# Patient Record
Sex: MALE | Race: WHITE | Employment: FULL TIME | URBAN - METROPOLITAN AREA
[De-identification: names, ages, dates, MRNs, and addresses within clinical notes are randomized per-mention and may not be internally consistent; named-entity substitution may affect disease eponyms.]

---

## 2020-03-07 ENCOUNTER — HOSPITAL ENCOUNTER (OUTPATIENT)
Dept: CT IMAGING | Age: 45
Discharge: HOME | End: 2020-03-07
Attending: SURGERY
Payer: COMMERCIAL

## 2020-03-07 DIAGNOSIS — R14.0 GASTRIC TYMPANY: ICD-10-CM

## 2020-03-07 DIAGNOSIS — R10.13 ABDOMINAL PAIN, EPIGASTRIC: ICD-10-CM

## 2020-03-07 PROCEDURE — 74011636320 HC RX REV CODE- 636/320: Performed by: RADIOLOGY

## 2020-03-07 PROCEDURE — 74011000258 HC RX REV CODE- 258: Performed by: RADIOLOGY

## 2020-03-07 PROCEDURE — 74177 CT ABD & PELVIS W/CONTRAST: CPT

## 2020-03-07 RX ORDER — SODIUM CHLORIDE 0.9 % (FLUSH) 0.9 %
10 SYRINGE (ML) INJECTION
Status: COMPLETED | OUTPATIENT
Start: 2020-03-07 | End: 2020-03-07

## 2020-03-07 RX ADMIN — Medication 10 ML: at 14:44

## 2020-03-07 RX ADMIN — IOPAMIDOL 100 ML: 755 INJECTION, SOLUTION INTRAVENOUS at 14:43

## 2020-03-07 RX ADMIN — SODIUM CHLORIDE 100 ML: 900 INJECTION, SOLUTION INTRAVENOUS at 14:44

## 2020-03-25 ENCOUNTER — HOSPITAL ENCOUNTER (EMERGENCY)
Age: 45
Discharge: HOME | End: 2020-03-25
Attending: EMERGENCY MEDICINE
Payer: COMMERCIAL

## 2020-03-25 ENCOUNTER — APPOINTMENT (OUTPATIENT)
Dept: ULTRASOUND IMAGING | Age: 45
End: 2020-03-25
Attending: EMERGENCY MEDICINE
Payer: COMMERCIAL

## 2020-03-25 VITALS
TEMPERATURE: 98 F | RESPIRATION RATE: 16 BRPM | DIASTOLIC BLOOD PRESSURE: 66 MMHG | OXYGEN SATURATION: 98 % | SYSTOLIC BLOOD PRESSURE: 132 MMHG | HEART RATE: 92 BPM

## 2020-03-25 DIAGNOSIS — R17 JAUNDICE: ICD-10-CM

## 2020-03-25 DIAGNOSIS — K70.31 ALCOHOLIC CIRRHOSIS OF LIVER WITH ASCITES (HCC): Primary | ICD-10-CM

## 2020-03-25 LAB
ALBUMIN SERPL-MCNC: 2 G/DL (ref 3.5–5)
ALBUMIN/GLOB SERPL: 0.3 {RATIO} (ref 1.1–2.2)
ALP SERPL-CCNC: 300 U/L (ref 45–117)
ALT SERPL-CCNC: 41 U/L (ref 12–78)
ANION GAP SERPL CALC-SCNC: 5 MMOL/L (ref 5–15)
AST SERPL-CCNC: 242 U/L (ref 15–37)
BASOPHILS # BLD: 0.1 K/UL (ref 0–0.1)
BASOPHILS NFR BLD: 2 % (ref 0–1)
BILIRUB SERPL-MCNC: 10.6 MG/DL (ref 0.2–1)
BUN SERPL-MCNC: 5 MG/DL (ref 6–20)
BUN/CREAT SERPL: 6 (ref 12–20)
CALCIUM SERPL-MCNC: 8.5 MG/DL (ref 8.5–10.1)
CHLORIDE SERPL-SCNC: 100 MMOL/L (ref 97–108)
CO2 SERPL-SCNC: 29 MMOL/L (ref 21–32)
COMMENT, HOLDF: NORMAL
CREAT SERPL-MCNC: 0.84 MG/DL (ref 0.7–1.3)
DIFFERENTIAL METHOD BLD: ABNORMAL
EOSINOPHIL # BLD: 0.1 K/UL (ref 0–0.4)
EOSINOPHIL NFR BLD: 1 % (ref 0–7)
ERYTHROCYTE [DISTWIDTH] IN BLOOD BY AUTOMATED COUNT: 19 % (ref 11.5–14.5)
GLOBULIN SER CALC-MCNC: 7.1 G/DL (ref 2–4)
GLUCOSE SERPL-MCNC: 103 MG/DL (ref 65–100)
HCT VFR BLD AUTO: 30.2 % (ref 36.6–50.3)
HGB BLD-MCNC: 10.7 G/DL (ref 12.1–17)
IMM GRANULOCYTES # BLD AUTO: 0 K/UL (ref 0–0.04)
IMM GRANULOCYTES NFR BLD AUTO: 1 % (ref 0–0.5)
LIPASE SERPL-CCNC: 503 U/L (ref 73–393)
LYMPHOCYTES # BLD: 1 K/UL (ref 0.8–3.5)
LYMPHOCYTES NFR BLD: 18 % (ref 12–49)
MCH RBC QN AUTO: 33.3 PG (ref 26–34)
MCHC RBC AUTO-ENTMCNC: 35.4 G/DL (ref 30–36.5)
MCV RBC AUTO: 94.1 FL (ref 80–99)
MONOCYTES # BLD: 0.4 K/UL (ref 0–1)
MONOCYTES NFR BLD: 8 % (ref 5–13)
NEUTS SEG # BLD: 4.1 K/UL (ref 1.8–8)
NEUTS SEG NFR BLD: 71 % (ref 32–75)
NRBC # BLD: 0 K/UL (ref 0–0.01)
NRBC BLD-RTO: 0 PER 100 WBC
PLATELET # BLD AUTO: 72 K/UL (ref 150–400)
PMV BLD AUTO: 10.9 FL (ref 8.9–12.9)
POTASSIUM SERPL-SCNC: 3.4 MMOL/L (ref 3.5–5.1)
PROT SERPL-MCNC: 9.1 G/DL (ref 6.4–8.2)
RBC # BLD AUTO: 3.21 M/UL (ref 4.1–5.7)
SAMPLES BEING HELD,HOLD: NORMAL
SODIUM SERPL-SCNC: 134 MMOL/L (ref 136–145)
WBC # BLD AUTO: 5.7 K/UL (ref 4.1–11.1)

## 2020-03-25 PROCEDURE — 85025 COMPLETE CBC W/AUTO DIFF WBC: CPT

## 2020-03-25 PROCEDURE — 36415 COLL VENOUS BLD VENIPUNCTURE: CPT

## 2020-03-25 PROCEDURE — 80053 COMPREHEN METABOLIC PANEL: CPT

## 2020-03-25 PROCEDURE — 99283 EMERGENCY DEPT VISIT LOW MDM: CPT

## 2020-03-25 PROCEDURE — 83690 ASSAY OF LIPASE: CPT

## 2020-03-25 PROCEDURE — 76705 ECHO EXAM OF ABDOMEN: CPT

## 2020-03-25 NOTE — ED PROVIDER NOTES
Patient is a 79-year-old gentleman with a history of alcoholic cirrhosis with resultant varices. He reports that he had a hemorrhoidectomy 2 months ago and has been out of work since his surgery, drinking heavily - going through a half gallon of whiskey every 3 days. He reports that initially after his surgery he had monique hematochezia but that this has resolved in the last 6 weeks without subsequent an hematochezia or melena. Patient reports that he has had nausea and generally vomits in the morning with some flecks of blood but denies coffee-ground emesis or large hematemesis. Patient reports lightheadedness and dizziness as well as abdominal distention; denies constipation & diarrhea, and severe abdominal pain. Reports that he was sent in for elevated liver enzymes, elevated lipase, and low hemoglobin. The history is provided by the patient. Abnormal Lab Results   Pertinent negatives include no chest pain, no abdominal pain and no shortness of breath. Past Medical History:   Diagnosis Date    Pancreatitis        Past Surgical History:   Procedure Laterality Date    HX HEMORRHOIDECTOMY           History reviewed. No pertinent family history.     Social History     Socioeconomic History    Marital status: SINGLE     Spouse name: Not on file    Number of children: Not on file    Years of education: Not on file    Highest education level: Not on file   Occupational History    Not on file   Social Needs    Financial resource strain: Not on file    Food insecurity     Worry: Not on file     Inability: Not on file    Transportation needs     Medical: Not on file     Non-medical: Not on file   Tobacco Use    Smoking status: Not on file   Substance and Sexual Activity    Alcohol use: Not on file    Drug use: Not on file    Sexual activity: Not on file   Lifestyle    Physical activity     Days per week: Not on file     Minutes per session: Not on file    Stress: Not on file   Relationships  Social connections     Talks on phone: Not on file     Gets together: Not on file     Attends Voodoo service: Not on file     Active member of club or organization: Not on file     Attends meetings of clubs or organizations: Not on file     Relationship status: Not on file    Intimate partner violence     Fear of current or ex partner: Not on file     Emotionally abused: Not on file     Physically abused: Not on file     Forced sexual activity: Not on file   Other Topics Concern    Not on file   Social History Narrative    Not on file         ALLERGIES: Patient has no known allergies. Review of Systems   Constitutional: Negative for chills and fever. Respiratory: Negative for shortness of breath. Cardiovascular: Negative for chest pain. Gastrointestinal: Positive for abdominal distention, blood in stool and vomiting. Negative for abdominal pain, constipation and diarrhea. Neurological: Positive for light-headedness. Negative for dizziness. All other systems reviewed and are negative. Vitals:    03/25/20 1155   BP: 136/75   Pulse: 85   Resp: 16   Temp: 98.7 °F (37.1 °C)   SpO2: 98%            Physical Exam  Vitals signs and nursing note reviewed. Constitutional:       General: He is not in acute distress. Appearance: He is well-developed. HENT:      Head: Normocephalic and atraumatic. Mouth/Throat:      Mouth: Mucous membranes are moist.   Eyes:      Conjunctiva/sclera: Conjunctivae normal.      Pupils: Pupils are equal, round, and reactive to light. Neck:      Musculoskeletal: Normal range of motion. Cardiovascular:      Rate and Rhythm: Normal rate and regular rhythm. Pulmonary:      Effort: Pulmonary effort is normal.      Breath sounds: Normal breath sounds. Abdominal:      General: There is distension. Palpations: Abdomen is soft. Tenderness: There is no abdominal tenderness. Musculoskeletal: Normal range of motion.    Skin:     General: Skin is warm and dry. Capillary Refill: Capillary refill takes less than 2 seconds. Coloration: Skin is jaundiced. Neurological:      General: No focal deficit present. Mental Status: He is alert and oriented to person, place, and time. Psychiatric:         Mood and Affect: Mood normal.         Behavior: Behavior normal.         Thought Content: Thought content normal.          MDM  Number of Diagnoses or Management Options  Alcoholic cirrhosis of liver with ascites ([address]): established and worsening  Jaundice: new and requires workup  Diagnosis management comments: The patient is resting comfortably and feels better, is alert and in no distress. The repeat examination is unremarkable and benign; in particular, there is no [facility]. The history, exam, diagnostic testing, and current condition do not suggest acute appendicitis, bowel obstruction, incarcerated hernia, acute cholecystitis, bowel perforation, major gastrointestinal bleeding, severe diverticulitis, sepsis, or other significant pathology to warrant further testing, continued ED treatment, admission, or surgical evaluation at this point. The vital signs have been stable and are within normal limits at this time. The patient does not have uncontrollable pain, intractable vomiting, or other significant symptoms. The patient's condition is stable and appropriate for discharge. The patient will pursue further outpatient evaluation with the primary care physician or other designated or consulting physician as indicated in the discharge instructions. Procedures      3:42 PM  I discussed the patient's case and results with Dr. King Tang, gastroenterology, and he agrees that. Patient does not require an acute evaluation or admission to the hospital.  Patient can follow-up with gastroenterology as an outpatient for additional evaluation of his alcoholic cirrhosis.       Patient has been counseled to quit alcohol and the patient's results have been reviewed with them and/or available family. Patient and/or family verbally conveyed their understanding and agreement of the patient's signs, symptoms, diagnosis, treatment and prognosis and additionally agree to follow up as recommended in the discharge instructions or to return to the Emergency Room should their condition change prior to their follow-up appointment. The patient/family verbally agrees with the care-plan and verbally conveys that all of their questions have been answered. The discharge instructions have also been provided to the patient and/or family with some educational information regarding the patient's diagnosis as well a list of reasons why the patient would want to return to the ER prior to their follow-up appointment, should their condition change.

## 2020-04-14 ENCOUNTER — APPOINTMENT (OUTPATIENT)
Dept: ULTRASOUND IMAGING | Age: 45
DRG: 444 | End: 2020-04-14
Attending: EMERGENCY MEDICINE
Payer: COMMERCIAL

## 2020-04-14 ENCOUNTER — APPOINTMENT (OUTPATIENT)
Dept: NUCLEAR MEDICINE | Age: 45
DRG: 444 | End: 2020-04-14
Attending: HOSPITALIST
Payer: COMMERCIAL

## 2020-04-14 ENCOUNTER — HOSPITAL ENCOUNTER (INPATIENT)
Age: 45
LOS: 4 days | Discharge: HOME | DRG: 444 | End: 2020-04-18
Attending: EMERGENCY MEDICINE | Admitting: HOSPITALIST
Payer: COMMERCIAL

## 2020-04-14 DIAGNOSIS — K31.89 PORTAL HYPERTENSIVE GASTROPATHY (HCC): ICD-10-CM

## 2020-04-14 DIAGNOSIS — K70.11 ALCOHOLIC HEPATITIS WITH ASCITES: ICD-10-CM

## 2020-04-14 DIAGNOSIS — K70.31 ASCITES DUE TO ALCOHOLIC CIRRHOSIS (HCC): ICD-10-CM

## 2020-04-14 DIAGNOSIS — F10.939 ALCOHOL WITHDRAWAL SYNDROME WITH COMPLICATION (HCC): ICD-10-CM

## 2020-04-14 DIAGNOSIS — R10.13 EPIGASTRIC ABDOMINAL PAIN: Primary | ICD-10-CM

## 2020-04-14 DIAGNOSIS — K70.31 ALCOHOLIC CIRRHOSIS OF LIVER WITH ASCITES (HCC): ICD-10-CM

## 2020-04-14 DIAGNOSIS — K76.82 HEPATIC ENCEPHALOPATHY: ICD-10-CM

## 2020-04-14 DIAGNOSIS — I86.4 GASTRIC VARICES: ICD-10-CM

## 2020-04-14 DIAGNOSIS — D64.9 ANEMIA, UNSPECIFIED TYPE: ICD-10-CM

## 2020-04-14 DIAGNOSIS — K81.9 CHOLECYSTITIS: ICD-10-CM

## 2020-04-14 DIAGNOSIS — K76.6 PORTAL HYPERTENSIVE GASTROPATHY (HCC): ICD-10-CM

## 2020-04-14 DIAGNOSIS — E87.1 HYPONATREMIA: ICD-10-CM

## 2020-04-14 DIAGNOSIS — K70.11 ASCITES DUE TO ALCOHOLIC HEPATITIS: ICD-10-CM

## 2020-04-14 DIAGNOSIS — D69.6 THROMBOCYTOPENIA (HCC): ICD-10-CM

## 2020-04-14 LAB
ALBUMIN SERPL-MCNC: 1.6 G/DL (ref 3.5–5)
ALBUMIN/GLOB SERPL: 0.2 {RATIO} (ref 1.1–2.2)
ALP SERPL-CCNC: 244 U/L (ref 45–117)
ALT SERPL-CCNC: 37 U/L (ref 12–78)
ANION GAP SERPL CALC-SCNC: 5 MMOL/L (ref 5–15)
AST SERPL-CCNC: 126 U/L (ref 15–37)
BASOPHILS # BLD: 0.1 K/UL (ref 0–0.1)
BASOPHILS NFR BLD: 2 % (ref 0–1)
BILIRUB SERPL-MCNC: 8.9 MG/DL (ref 0.2–1)
BUN SERPL-MCNC: 5 MG/DL (ref 6–20)
BUN/CREAT SERPL: 6 (ref 12–20)
CALCIUM SERPL-MCNC: 8.1 MG/DL (ref 8.5–10.1)
CHLORIDE SERPL-SCNC: 103 MMOL/L (ref 97–108)
CO2 SERPL-SCNC: 25 MMOL/L (ref 21–32)
COMMENT, HOLDF: NORMAL
CREAT SERPL-MCNC: 0.81 MG/DL (ref 0.7–1.3)
DIFFERENTIAL METHOD BLD: ABNORMAL
EOSINOPHIL # BLD: 0.2 K/UL (ref 0–0.4)
EOSINOPHIL NFR BLD: 2 % (ref 0–7)
ERYTHROCYTE [DISTWIDTH] IN BLOOD BY AUTOMATED COUNT: 18.6 % (ref 11.5–14.5)
GLOBULIN SER CALC-MCNC: 6.6 G/DL (ref 2–4)
GLUCOSE SERPL-MCNC: 99 MG/DL (ref 65–100)
HCT VFR BLD AUTO: 31.8 % (ref 36.6–50.3)
HGB BLD-MCNC: 10.9 G/DL (ref 12.1–17)
IMM GRANULOCYTES # BLD AUTO: 0.1 K/UL (ref 0–0.04)
IMM GRANULOCYTES NFR BLD AUTO: 1 % (ref 0–0.5)
LACTATE SERPL-SCNC: 2 MMOL/L (ref 0.4–2)
LIPASE SERPL-CCNC: 548 U/L (ref 73–393)
LYMPHOCYTES # BLD: 1.3 K/UL (ref 0.8–3.5)
LYMPHOCYTES NFR BLD: 15 % (ref 12–49)
MAGNESIUM SERPL-MCNC: 1.9 MG/DL (ref 1.6–2.4)
MCH RBC QN AUTO: 35.3 PG (ref 26–34)
MCHC RBC AUTO-ENTMCNC: 34.3 G/DL (ref 30–36.5)
MCV RBC AUTO: 102.9 FL (ref 80–99)
MONOCYTES # BLD: 1 K/UL (ref 0–1)
MONOCYTES NFR BLD: 12 % (ref 5–13)
NEUTS SEG # BLD: 5.9 K/UL (ref 1.8–8)
NEUTS SEG NFR BLD: 69 % (ref 32–75)
NRBC # BLD: 0 K/UL (ref 0–0.01)
NRBC BLD-RTO: 0 PER 100 WBC
PLATELET # BLD AUTO: 102 K/UL (ref 150–400)
PMV BLD AUTO: 10.9 FL (ref 8.9–12.9)
POTASSIUM SERPL-SCNC: 3.2 MMOL/L (ref 3.5–5.1)
PROT SERPL-MCNC: 8.2 G/DL (ref 6.4–8.2)
RBC # BLD AUTO: 3.09 M/UL (ref 4.1–5.7)
SAMPLES BEING HELD,HOLD: NORMAL
SODIUM SERPL-SCNC: 133 MMOL/L (ref 136–145)
WBC # BLD AUTO: 8.5 K/UL (ref 4.1–11.1)

## 2020-04-14 PROCEDURE — 74011250637 HC RX REV CODE- 250/637: Performed by: HOSPITALIST

## 2020-04-14 PROCEDURE — 36415 COLL VENOUS BLD VENIPUNCTURE: CPT

## 2020-04-14 PROCEDURE — 83690 ASSAY OF LIPASE: CPT

## 2020-04-14 PROCEDURE — A9537 TC99M MEBROFENIN: HCPCS

## 2020-04-14 PROCEDURE — 96365 THER/PROPH/DIAG IV INF INIT: CPT

## 2020-04-14 PROCEDURE — 74011000258 HC RX REV CODE- 258: Performed by: HOSPITALIST

## 2020-04-14 PROCEDURE — 74011000258 HC RX REV CODE- 258: Performed by: EMERGENCY MEDICINE

## 2020-04-14 PROCEDURE — 74011250636 HC RX REV CODE- 250/636: Performed by: HOSPITALIST

## 2020-04-14 PROCEDURE — 85025 COMPLETE CBC W/AUTO DIFF WBC: CPT

## 2020-04-14 PROCEDURE — C9113 INJ PANTOPRAZOLE SODIUM, VIA: HCPCS | Performed by: EMERGENCY MEDICINE

## 2020-04-14 PROCEDURE — 76705 ECHO EXAM OF ABDOMEN: CPT

## 2020-04-14 PROCEDURE — 83735 ASSAY OF MAGNESIUM: CPT

## 2020-04-14 PROCEDURE — 87040 BLOOD CULTURE FOR BACTERIA: CPT

## 2020-04-14 PROCEDURE — 99284 EMERGENCY DEPT VISIT MOD MDM: CPT

## 2020-04-14 PROCEDURE — 65270000032 HC RM SEMIPRIVATE

## 2020-04-14 PROCEDURE — 74011000250 HC RX REV CODE- 250: Performed by: HOSPITALIST

## 2020-04-14 PROCEDURE — C9113 INJ PANTOPRAZOLE SODIUM, VIA: HCPCS | Performed by: HOSPITALIST

## 2020-04-14 PROCEDURE — 74011250636 HC RX REV CODE- 250/636: Performed by: EMERGENCY MEDICINE

## 2020-04-14 PROCEDURE — 74011000250 HC RX REV CODE- 250: Performed by: EMERGENCY MEDICINE

## 2020-04-14 PROCEDURE — 96375 TX/PRO/DX INJ NEW DRUG ADDON: CPT

## 2020-04-14 PROCEDURE — 80053 COMPREHEN METABOLIC PANEL: CPT

## 2020-04-14 PROCEDURE — 83605 ASSAY OF LACTIC ACID: CPT

## 2020-04-14 RX ORDER — SPIRONOLACTONE 100 MG/1
100 TABLET, FILM COATED ORAL DAILY
Status: DISCONTINUED | OUTPATIENT
Start: 2020-04-15 | End: 2020-04-16

## 2020-04-14 RX ORDER — SODIUM CHLORIDE 0.9 % (FLUSH) 0.9 %
10 SYRINGE (ML) INJECTION
Status: DISPENSED | OUTPATIENT
Start: 2020-04-14 | End: 2020-04-15

## 2020-04-14 RX ORDER — SODIUM CHLORIDE 9 MG/ML
25 INJECTION, SOLUTION INTRAVENOUS
Status: COMPLETED | OUTPATIENT
Start: 2020-04-14 | End: 2020-04-14

## 2020-04-14 RX ORDER — FUROSEMIDE 40 MG/1
40 TABLET ORAL DAILY
Status: DISCONTINUED | OUTPATIENT
Start: 2020-04-15 | End: 2020-04-16

## 2020-04-14 RX ORDER — LORAZEPAM 2 MG/ML
2 INJECTION INTRAMUSCULAR
Status: DISCONTINUED | OUTPATIENT
Start: 2020-04-14 | End: 2020-04-18 | Stop reason: HOSPADM

## 2020-04-14 RX ORDER — LORAZEPAM 2 MG/ML
4 INJECTION INTRAMUSCULAR
Status: DISCONTINUED | OUTPATIENT
Start: 2020-04-14 | End: 2020-04-18 | Stop reason: HOSPADM

## 2020-04-14 RX ORDER — ONDANSETRON 2 MG/ML
4 INJECTION INTRAMUSCULAR; INTRAVENOUS
Status: COMPLETED | OUTPATIENT
Start: 2020-04-14 | End: 2020-04-14

## 2020-04-14 RX ORDER — SODIUM CHLORIDE 0.9 % (FLUSH) 0.9 %
5-40 SYRINGE (ML) INJECTION EVERY 8 HOURS
Status: DISCONTINUED | OUTPATIENT
Start: 2020-04-14 | End: 2020-04-18 | Stop reason: HOSPADM

## 2020-04-14 RX ORDER — NALOXONE HYDROCHLORIDE 0.4 MG/ML
0.4 INJECTION, SOLUTION INTRAMUSCULAR; INTRAVENOUS; SUBCUTANEOUS AS NEEDED
Status: DISCONTINUED | OUTPATIENT
Start: 2020-04-14 | End: 2020-04-18 | Stop reason: HOSPADM

## 2020-04-14 RX ORDER — ONDANSETRON 2 MG/ML
4 INJECTION INTRAMUSCULAR; INTRAVENOUS
Status: DISCONTINUED | OUTPATIENT
Start: 2020-04-14 | End: 2020-04-18 | Stop reason: HOSPADM

## 2020-04-14 RX ORDER — ALBUMIN HUMAN 250 G/1000ML
25 SOLUTION INTRAVENOUS EVERY 6 HOURS
Status: DISCONTINUED | OUTPATIENT
Start: 2020-04-15 | End: 2020-04-18 | Stop reason: HOSPADM

## 2020-04-14 RX ORDER — POTASSIUM CHLORIDE 7.45 MG/ML
10 INJECTION INTRAVENOUS
Status: ACTIVE | OUTPATIENT
Start: 2020-04-14 | End: 2020-04-14

## 2020-04-14 RX ORDER — OXYCODONE AND ACETAMINOPHEN 5; 325 MG/1; MG/1
1 TABLET ORAL
Status: DISCONTINUED | OUTPATIENT
Start: 2020-04-14 | End: 2020-04-18 | Stop reason: HOSPADM

## 2020-04-14 RX ORDER — SODIUM CHLORIDE 0.9 % (FLUSH) 0.9 %
5-40 SYRINGE (ML) INJECTION AS NEEDED
Status: DISCONTINUED | OUTPATIENT
Start: 2020-04-14 | End: 2020-04-18 | Stop reason: HOSPADM

## 2020-04-14 RX ORDER — FENTANYL CITRATE 50 UG/ML
25 INJECTION, SOLUTION INTRAMUSCULAR; INTRAVENOUS ONCE
Status: COMPLETED | OUTPATIENT
Start: 2020-04-14 | End: 2020-04-14

## 2020-04-14 RX ORDER — POTASSIUM CHLORIDE 7.45 MG/ML
10 INJECTION INTRAVENOUS
Status: COMPLETED | OUTPATIENT
Start: 2020-04-14 | End: 2020-04-14

## 2020-04-14 RX ADMIN — SODIUM CHLORIDE 3.38 G: 900 INJECTION, SOLUTION INTRAVENOUS at 09:24

## 2020-04-14 RX ADMIN — POTASSIUM CHLORIDE 10 MEQ: 10 INJECTION, SOLUTION INTRAVENOUS at 18:27

## 2020-04-14 RX ADMIN — POTASSIUM CHLORIDE 10 MEQ: 10 INJECTION, SOLUTION INTRAVENOUS at 17:08

## 2020-04-14 RX ADMIN — Medication 10 ML: at 17:07

## 2020-04-14 RX ADMIN — OXYCODONE HYDROCHLORIDE AND ACETAMINOPHEN 1 TABLET: 5; 325 TABLET ORAL at 22:26

## 2020-04-14 RX ADMIN — SINCALIDE 1.59 MCG: 5 INJECTION, POWDER, LYOPHILIZED, FOR SOLUTION INTRAVENOUS at 14:05

## 2020-04-14 RX ADMIN — SODIUM CHLORIDE 40 MG: 9 INJECTION INTRAMUSCULAR; INTRAVENOUS; SUBCUTANEOUS at 20:27

## 2020-04-14 RX ADMIN — FENTANYL CITRATE 25 MCG: 50 INJECTION INTRAMUSCULAR; INTRAVENOUS at 08:36

## 2020-04-14 RX ADMIN — SODIUM CHLORIDE 40 MG: 9 INJECTION INTRAMUSCULAR; INTRAVENOUS; SUBCUTANEOUS at 08:36

## 2020-04-14 RX ADMIN — FOLIC ACID: 5 INJECTION, SOLUTION INTRAMUSCULAR; INTRAVENOUS; SUBCUTANEOUS at 15:45

## 2020-04-14 RX ADMIN — SODIUM CHLORIDE 25 ML: 900 INJECTION, SOLUTION INTRAVENOUS at 14:05

## 2020-04-14 RX ADMIN — SODIUM CHLORIDE 500 ML: 900 INJECTION, SOLUTION INTRAVENOUS at 08:37

## 2020-04-14 RX ADMIN — ONDANSETRON 4 MG: 2 INJECTION, SOLUTION INTRAMUSCULAR; INTRAVENOUS at 09:46

## 2020-04-14 RX ADMIN — SODIUM CHLORIDE 3.38 G: 900 INJECTION, SOLUTION INTRAVENOUS at 20:28

## 2020-04-14 NOTE — ROUTINE PROCESS
TRANSFER - OUT REPORT: 
 
Verbal report given to Lisa Gil (name) on Lyle Rodriguez  being transferred to [facility] (unit) for routine progression of care Report consisted of patients Situation, Background, Assessment and  
Recommendations(SBAR). Information from the following report(s) SBAR and ED Summary was reviewed with the receiving nurse. Lines:  
Peripheral IV 04/14/20 Left Antecubital (Active) Site Assessment Clean, dry, & intact 4/14/2020  8:32 AM  
Phlebitis Assessment 0 4/14/2020  8:32 AM  
Infiltration Assessment 0 4/14/2020  8:32 AM  
Dressing Status Clean, dry, & intact 4/14/2020  8:32 AM  
  
 
Opportunity for questions and clarification was provided. Patient transported with: 
 Post-i

## 2020-04-14 NOTE — H&P
[address]  HISTORY AND PHYSICAL    Name:  Koffi Weiss  MR#:  [id number]  :  1975  ACCOUNT #:  [de-identified]  ADMIT DATE:  2020    PRIMARY CARE PROVIDER: Louetta Essex, MD    SOURCE OF INFORMATION:  The patient. CHIEF COMPLAINT:  Epigastric pain since this morning. HISTORY OF PRESENTING ILLNESS:  This is a 76-year-old male patient with past medical history significant for alcoholic liver cirrhosis, alcohol and tobacco abuse, presented to [facility] Emergency Department with epigastric pain since this morning. He stated that when he woke up, he started to have sharp constant epigastric pain, 6/10, which progressively worse and decided to come to [facility] Emergency Department. No nausea, vomiting, melena, hematochezia, urinary complaint, fever, left-sided chest pain, palpitation, cough, or sick contact. He said he cuts back his alcohol and he smokes 1/2 pack per day. He said he was tested for COVID-19 on 2020 and he got results yesterday, which was negative. In the ER, his vital signs, blood pressure was 121/76, pulse 98, temperature 98.5, saturation of oxygen 99% on room air. Surgical Service is consulted. They recommended HIDA scan and the patient referred to hospitalist service for further evaluation and admission. REVIEW OF SYSTEMS:  Pertinent positive findings mentioned in the HPI. All systems reviewed, no any other positive finding. PAST MEDICAL HISTORY:  1. Pancreatitis. 2.  Alcoholic liver cirrhosis. 3.  Alcohol abuse. 4.  Tobacco abuse. PRIOR TO ADMISSION MEDICATIONS:  None. ALLERGIES:  NO KNOWN DRUG ALLERGIES. SOCIAL HISTORY:  Lives with his girlfriend. He cuts back the alcohol. He said he drinks whiskey and he smokes 1/2 pack per day. No any other recreational drugs. Ambulates independently. CODE STATUS:  Full code. FAMILY HISTORY:  He said he does not know about his father's health, he does not know him.   Mother, he said, is healthy. PHYSICAL EXAMINATION:  VITAL SIGNS:  Blood pressure 110/66, pulse 98, temperature 98.5, respiratory rate 18, saturation of oxygen 99% on room air. GENERAL APPEARANCE:  The patient is alert, cooperative, not in distress. He appears his stated age. HEENT:  Pink conjunctivae. He has icteric sclerae bilaterally. LUNGS:  Clear to auscultation bilaterally. CHEST WALL:  No tenderness or deformity. CARDIOVASCULAR SYSTEM:  Regular rate and rhythm. S1 and S2 normal.  No murmur or gallop. ABDOMEN:  Soft, nontender. Bowel sounds normal.  No mass or organomegaly. EXTREMITIES:  No cyanosis or edema. SKIN:  No rash or lesion. CENTRAL NERVOUS SYSTEM:  Conscious, well oriented to time, place, and person. Motor 5/5. Sensation intact. Cranial nerves II-XII grossly intact. LABORATORY DATA:  CBC:  White blood cell count 8.5, hemoglobin 10.9, hematocrit 31.8, .9, platelet count 762. Chemistry:  Sodium 133, potassium 3.2, chloride 103, CO2 25, anion gap 5, glucose 99, BUN 5, creatinine 0.81, BUN-creatinine ratio 6, calcium 8.1, magnesium 1.9. Total bilirubin 8.9, his previous bilirubin was 10.6, total protein 8.2, albumin 1.6, ALT 37, , alkaline phosphatase 244, lipase 548. Ultrasound of the abdomen, sonographic stigmata of hepatic cirrhosis with liver heterogeneity which limits the sensitivity of ultrasound, focal parenchymal abnormalities, gallbladder distention with mild wall thickening and focal tenderness, but no calculus correlates for acute cholecystitis, ascites, possible tiny nonobstructing right intrarenal calculi. ASSESSMENT:  1.  Epigastric pain, possibly due to acute cholecystitis. 2.  Jaundice, rule out obstructive jaundice. 3.  Possible acute pancreatitis. 4.  Elevated liver enzyme with history of alcoholic liver cirrhosis. 5.  Thrombocytopenia. 6.  Anemia of chronic disease. 7.  Hyponatremia. 8.  Hypokalemia. 9.  Alcohol abuse.   10.  Tobacco abuse.    PLAN:  1. Epigastric pain, possibly due to acute cholecystitis versus acute pancreatitis. Admit the patient to medical floor. Keep the patient n.p.o.  Surgical Service on board. will give him iv zosyn, normal saline with folic acid, thiamine, multivitamin, and vitamin K 75 mL/hour, p.r.n. zofran, will give him IV Zosyn and follow up blood culture and HIDA scan. 2.  Jaundice, rule out obstructive jaundice. HIDA scan is ordered. Monitor bilirubin. Consult GI  3. Possible acute pancreatitis. Keep the patient n.p.o., normal saline with folic acid, thiamine, multivitamin, p.r.n. analgesics. 4.  Alcoholic liver cirrhosis; elevated liver enzyme, elevated bilirubin; thrombocytopenia.  will check INR and  will consult hepatologist   5. Anemia of chronic disease. No evidence of active bleeding. Monitor H and H.  6.  Hyponatremia, likely due to liver cirrhosis and alcohol. Repeat BMP in a.m. and monitor electrolytes. 7.  Hypokalemia. Replace with KCl and repeat potassium level. 8.  Alcohol abuse. He is advised to stop alcohol.  will put him on lorazepam per CIWA protocol, withdrawal syndrome precaution, seizure precaution. 9.  Tobacco abuse. The patient is advised to stop smoking. 10.  Thrombocytopenia, likely due to liver cirrhosis with platelet count 082. No evidence of bleeding. No need for platelet transfusion at this point. DVT prophylaxis. Sequential compressive device. The patient is a high risk for further decompensation. We will closely monitor his liver enzymes and blood cultures.         Cheryl Jimenez MD      [id number]  D:  04/14/2020 11:12  T:  04/14/2020 13:22  JOB #:  [id number]

## 2020-04-14 NOTE — CONSULTS
General Surgery ER Consultation    Admit Date: 4/14/2020  Reason for Consultation: possible cholecystitis    HPI:  Sung Dailey is a 38 y.o. male w/ hx alcoholic cirrhosis and varices who presents to the ED w/ c/o severe epigastric pain that started during the night.  +nausea, no vomiting; pain was also somewhat in the RUQ. No fevers. He had an episode similar to this in March and had an U/s that showed gallstones. The U/s today does not show stones. Labs reviewed. Has elevated LFTs, lipase and T bili. Pt is currently still drinking. U/s today  1. Sonographic stigmata of hepatic cirrhosis with liver heterogeneity which  limits the sensitivity of ultrasound focal parenchymal abnormalities  . Three-phase liver MRI would be more sensitive in detecting neoplasm. 2. Gallbladder distention with mild wall thickening and focal tenderness but no  calculi. Correlate for acute cholecystitis. 3. Ascites. 4. Possible tiny nonobstructing right intrarenal calculi      Patient Active Problem List    Diagnosis Date Noted    Cholecystitis 04/14/2020     Past Medical History:   Diagnosis Date    Pancreatitis       Past Surgical History:   Procedure Laterality Date    HX HEMORRHOIDECTOMY        Social History     Tobacco Use    Smoking status: Not on file   Substance Use Topics    Alcohol use: Not on file      History reviewed. No pertinent family history. Prior to Admission medications    Not on File     No Known Allergies       Subjective:     Review of Systems:    A comprehensive review of systems was negative except for that written in the History of Present Illness. Objective:     Blood pressure 127/68, pulse 98, temperature 98.5 °F (36.9 °C), resp. rate 18, SpO2 97 %.   Recent Results (from the past 24 hour(s))   CBC WITH AUTOMATED DIFF    Collection Time: 04/14/20  7:46 AM   Result Value Ref Range    WBC 8.5 4.1 - 11.1 K/uL    RBC 3.09 (L) 4.10 - 5.70 M/uL    HGB 10.9 (L) 12.1 - 17.0 g/dL    HCT 31.8 (L) 36.6 - 50.3 %    .9 (H) 80.0 - 99.0 FL    MCH 35.3 (H) 26.0 - 34.0 PG    MCHC 34.3 30.0 - 36.5 g/dL    RDW 18.6 (H) 11.5 - 14.5 %    PLATELET 470 (L) 976 - 400 K/uL    MPV 10.9 8.9 - 12.9 FL    NRBC 0.0 0  WBC    ABSOLUTE NRBC 0.00 0.00 - 0.01 K/uL    NEUTROPHILS 69 32 - 75 %    LYMPHOCYTES 15 12 - 49 %    MONOCYTES 12 5 - 13 %    EOSINOPHILS 2 0 - 7 %    BASOPHILS 2 (H) 0 - 1 %    IMMATURE GRANULOCYTES 1 (H) 0.0 - 0.5 %    ABS. NEUTROPHILS 5.9 1.8 - 8.0 K/UL    ABS. LYMPHOCYTES 1.3 0.8 - 3.5 K/UL    ABS. MONOCYTES 1.0 0.0 - 1.0 K/UL    ABS. EOSINOPHILS 0.2 0.0 - 0.4 K/UL    ABS. BASOPHILS 0.1 0.0 - 0.1 K/UL    ABS. IMM. GRANS. 0.1 (H) 0.00 - 0.04 K/UL    DF AUTOMATED     METABOLIC PANEL, COMPREHENSIVE    Collection Time: 04/14/20  7:46 AM   Result Value Ref Range    Sodium 133 (L) 136 - 145 mmol/L    Potassium 3.2 (L) 3.5 - 5.1 mmol/L    Chloride 103 97 - 108 mmol/L    CO2 25 21 - 32 mmol/L    Anion gap 5 5 - 15 mmol/L    Glucose 99 65 - 100 mg/dL    BUN 5 (L) 6 - 20 MG/DL    Creatinine 0.81 0.70 - 1.30 MG/DL    BUN/Creatinine ratio 6 (L) 12 - 20      GFR est AA >60 >60 ml/min/1.73m2    GFR est non-AA >60 >60 ml/min/1.73m2    Calcium 8.1 (L) 8.5 - 10.1 MG/DL    Bilirubin, total 8.9 (H) 0.2 - 1.0 MG/DL    ALT (SGPT) 37 12 - 78 U/L    AST (SGOT) 126 (H) 15 - 37 U/L    Alk. phosphatase 244 (H) 45 - 117 U/L    Protein, total 8.2 6.4 - 8.2 g/dL    Albumin 1.6 (L) 3.5 - 5.0 g/dL    Globulin 6.6 (H) 2.0 - 4.0 g/dL    A-G Ratio 0.2 (L) 1.1 - 2.2     SAMPLES BEING HELD    Collection Time: 04/14/20  7:46 AM   Result Value Ref Range    SAMPLES BEING HELD 1red,1blu     COMMENT        Add-on orders for these samples will be processed based on acceptable specimen integrity and analyte stability, which may vary by analyte.    LIPASE    Collection Time: 04/14/20  7:46 AM   Result Value Ref Range    Lipase 548 (H) 73 - 393 U/L   MAGNESIUM    Collection Time: 04/14/20  7:46 AM   Result Value Ref Range Magnesium 1.9 1.6 - 2.4 mg/dL     _____________________  Physical Exam:     General:  Alert, cooperative, no distress, appears stated age. Eyes:   Sclera are icteric   Throat: Lips, mucosa, and tongue normal.   Neck: Supple, symmetrical, trachea midline. Lungs:   Clear to auscultation bilaterally. Heart:  Regular rate and rhythm. Abdomen:   Soft w/ some distention; minimal epigastric pain (rec'd fentanyl for pain). Bowel sounds normal.  + splenomegaly. Extremities: Extremities normal, atraumatic, no cyanosis or edema. Skin: Skin color is icteric; texture, turgor normal. No rashes or lesions. Assessment:   Active Problems:    Cholecystitis (4/14/2020)            Plan: Will order a HIDA scan to r/o cholecystitis  Gallbladder findings likely related to hepatic cirrhosis  Keep npo for now  Gi to see  D/w Dr Yony Parmar who will see after the HIDA scan is completed    Total time spent with patient: 25 minutes. Signed By: Monique Duran NP     April 14, 2020        I have independently examined the patient and have reviewed the chart. I agree with the above plan. The patient appears to have cirrhosis with epigastric and right upper quadrant abdominal pain. Ultrasounds appear to be conflicting 1 ultrasound shows stones the most recent one today does not, his CT scan does not appear to show any stones from March. He does have elevated lipase indicating he does have some level of pancreatitis. He also appears to have cirrhosis with very high bilirubin. Ultrasound findings are nonspecific and commonly found with cirrhosis but could not rule out cholecystitis without a HIDA scan. I have ordered a HIDA scan will follow-up on the result of this. He has some risk with his cirrhosis with cholecystectomy and would not do it unless the HIDA scan was positive. I agree with GI consultation. I will be following and see what the result of the HIDA scan shows.     Malik Doan MD

## 2020-04-14 NOTE — PROGRESS NOTES
Pt able to ambulate 200ft without difficulty and without loss of balance. No safety deficits noted. PT screened out and orders completed. Please reorder if patient begins to show deficits.     Annamarie Sims, JOZEFT, PT

## 2020-04-14 NOTE — CONSULTS
[address] [facility] NOTE  Ines Wood, [address] [facility] office  [phone] NP in-hospital cell phone M-F until 4:30  After 5pm or on weekends, please call  for physician on call        NAME:  Cristobal Vallejo   :   1975   MRN:   [id number]       Referring Physician: Zahira Tucker Date: 2020 10:52 AM    Chief Complaint: alcoholic cirrhosis, pancreatitis, possible cholecystitis     History of Present Illness:  Patient is a 38 y.o. who is seen in consultation at the request of Dr. Aide Soliz for alcoholic cirrhosis, pancreatitis, possible cholecystitis. He presented for epigastric pain - found to have elevated LFT's, gallbladder distention with mild wall thickening, ascites. He was evaluated in the office by Dr. Dustin Luna after an ER visit found to have elevated LFT's and cirrhosis with gastric and esophageal varices on CT. He was scheduled for EGD in . He reports he was drinking heavily since hemorrhoidectomy. He reports cutting back to 2 drinks per day since early March when he was told to have cirrhosis. He tried to quit drinking but didn't because of withdrawals. He reports he woke up with RUQ pain, he denies similar episodes. He denies any nausea or reflux. He reports recent weight loss, unclear how much. Rare NSAID's. +ETOH 2 drinks/day - last . +tobacco.  Colonoscopy by [facility] 2019: hemorrhoids    I have reviewed the emergency room note, hospital admission note, notes by all other clinicians who have seen the patient during this hospitalization to date. I have reviewed the problem list and the reason for this hospitalization. I have reviewed the allergies and the medications the patient was taking at home prior to this hospitalization.     PMH:  Past Medical History:   Diagnosis Date    Pancreatitis        PSH:  Past Surgical History:   Procedure Laterality Date    HX HEMORRHOIDECTOMY         Allergies:  No Known Allergies    Home Medications:  None       Hospital Medications:  No current facility-administered medications for this encounter. No current outpatient medications on file. Social History:  Social History     Tobacco Use    Smoking status: Not on file   Substance Use Topics    Alcohol use: Not on file       Family History:  History reviewed. No pertinent family history. Review of Systems:  Constitutional: negative fever, negative chills, +weight loss  Eyes:   negative visual changes  ENT:   negative sore throat, tongue or lip swelling  Respiratory:  negative cough, negative dyspnea  Cards:  negative for chest pain, palpitations, lower extremity edema  GI:   See HPI  :  negative for frequency, dysuria  Integument:  negative for rash and pruritus  Heme:  negative for easy bruising and gum/nose bleeding  Musculoskeletal:negative for myalgias, back pain and muscle weakness  Neuro:  negative for headaches, dizziness, vertigo  Psych:  negative for feelings of anxiety, depression     Objective:     Patient Vitals for the past 8 hrs:   BP Temp Pulse Resp SpO2   04/14/20 1045 110/66    99 %   04/14/20 1000 117/67    97 %   04/14/20 0930 127/68    97 %   04/14/20 0845 120/67    99 %   04/14/20 0731 121/76 98.5 °F (36.9 °C) 98 18 99 %     04/14 0701 - 04/14 1900  In: 500 [I.V.:500]  Out: -   No intake/output data recorded.     EXAM:     CONST:  Pleasant male lying in bed, no acute distress   NEURO:  alert and oriented x 3   HEENT: EOMI, no scleral icterus   LUNGS: clear to ausculation, (-) wheeze   CARD:  S1 S2   ABD:  soft, RUQ tenderness, no rebound, bowel sounds (+) all 4 quadrants, no masses, non distended   EXT:  no edema, warm   PSYCH: full, not anxious     Data Review     Recent Labs     04/14/20  0746   WBC 8.5   HGB 10.9*   HCT 31.8*   *     Recent Labs     04/14/20  0746   *   K 3.2*      CO2 25   BUN 5*   CREA 0.81   GLU 99   CA 8.1*     Recent Labs     04/14/20  0746   SGOT 126*   *   TP 8.2   ALB 1.6*   GLOB 6.6*   LPSE 548*     No results for input(s): INR, PTP, APTT, INREXT in the last 72 hours. IMPRESSION:   1. Sonographic stigmata of hepatic cirrhosis with liver heterogeneity which  limits the sensitivity of ultrasound focal parenchymal abnormalities  . Three-phase liver MRI would be more sensitive in detecting neoplasm. 2. Gallbladder distention with mild wall thickening and focal tenderness but no  calculi. Correlate for acute cholecystitis. 3. Ascites. 4. Possible tiny nonobstructing right intrarenal calculi   Assessment:     · Abdominal pain: RUQ; lipase 548, alk phos 244, , ALT 37, t bili 8.9  · Suspect ETOH cirrhosis with gastric and esophageal varices on CT, splenomegally     Patient Active Problem List   Diagnosis Code    Cholecystitis K81.9    Epigastric pain R10.13     Plan:     · NPO  · Supportive care - IVF, Pain/nausea medicine  · PPI  · Surgery following - HIDA pending  · Monitor LFT's  · Patient discussed with and will be seen by Dr. Kota Ortega  · Thank you for allowing me to participate in care of Emy Foley     Signed By: Farhan Simons NP     4/14/2020  10:52 AM       Gastroenterology Attending Physician attestation statement and comments. This patient was seen and examined by me in a face-to-face visit today. I reviewed the medical record including lab work, imaging and other provider notes. I confirmed the history as described above. I spoke to the patient, reviewed the medical record including lab work, imaging and other provider notes. I discussed this case in detail with King MEJIA. I formulated an  assessment of this patient and developed a treatment plan. I agree with the above consultation note. I agree with the history, exam and assessment and plan as outlined in the note. I would like to add the following:     Neuro: no asterixis.  Abd: normoactive BS, mild RUQ and epigastric pain, nd, no rebound/guarding. Elevated liver tests in a mixed picture-AST/ALT ratio > 2. Lower suspicion for biliary obstruction-HIDA scan with filling of the GB. Most likely due to EtOH abuse. Hepatology consult pending. PT ordered for to calculate Maddrey's DF and MELD. Followed by Dr. Yadira Pennington as outpatient.     Dr. Garett Zhang

## 2020-04-14 NOTE — PROGRESS NOTES
Reason for Admission:   Epigastric abdominal pain                    RUR Score:      1               Plan for utilizing home health:      Not at this time. TBD/subject to change pending recommendations,    PCP: Yes   Name of Practice: [facility]   Are you a current patient: Yes/No: Yes   Approximate date of last visit: March 24, 2020                    Current Advanced Directive/Advance Care Plan: No advance care plan on file at this time. Will address at a later time. Transition of Care Plan:          Anticipated EBONI plan home with family assistance. Would benefit from  resources. Will address at a later time. EBONI TBD/subject to change pending recommendations. CM will continue to follow and assist with EBONI needs as they arise. 38year old male w/ hx alcoholic cirrhosis and varices. Currently still drinkingPatient is  AOx4, independent with ADL's and IADL's. No DME utilized. Resides with girlfriendKevon [phone] in an apartment. Currently employed with [employer] with no significant financial stressors or concerns. Insurance verified: [employer]. [facility] is utilized for prescriptions. Girlfriend is able to transport at discharge. Care Management Interventions  PCP Verified by CM: Yes  Last Visit to PCP: 03/24/20  Palliative Care Criteria Met (RRAT>21 & CHF Dx)?: No  Mode of Transport at Discharge: Other (see comment)(Girlfriend to transport)  Transition of Care Consult (CM Consult): (No current CM consults or needs at this time)  Discharge Durable Medical Equipment: No  Physical Therapy Consult: No  Occupational Therapy Consult: No  Speech Therapy Consult: No  Current Support Network:  Other(Lives with SO)  Confirm Follow Up Transport: Family  Discharge Location  Discharge Placement: Home with family assistance(TBD/subject to change pending recommendations)    FELIPE Sheppard/DALE  Care Management  10:50 AM

## 2020-04-14 NOTE — PROGRESS NOTES
HIDA scan done and reported. He does not have cholecystitis. He does appear to have some biliary dyskinesia of the GB and also pancreatitis. With no cholecystitis, but has pancreatitis, cirrhosis I would not recommend cholecystectomy currently pt needs to be seen by GI/hepatology and optimized prior to elective surgery. He has a very high operative mortality risk. MELD score is likely 18-20 assuming his INR is normal, also Harpreet class C. I have ordered INR for tomorrow. He can have clears for now. Following.     Phyliss Scales

## 2020-04-14 NOTE — ED PROVIDER NOTES
HPI     Pt is a 38 y.o. M with PMH of pancreatitis and alcohol cirrhosis here with c/o epigastric abdominal pain since last night. Pt is a daily drinker. Says he drinks about 2 shot glasses of alcohol per day mixed with gingerale. His last drink was last night. Around 3 AM, he woke with sharp epigastric abdominal pain. It does not radiate. There are no exacerbating or relieving factors. He denies N/V/D or any other associated symptoms. No hematemesis and no melena or hematochezia. No other complaints at this time. Past Medical History:   Diagnosis Date    Pancreatitis        Past Surgical History:   Procedure Laterality Date    HX HEMORRHOIDECTOMY           History reviewed. No pertinent family history.     Social History     Socioeconomic History    Marital status: SINGLE     Spouse name: Not on file    Number of children: Not on file    Years of education: Not on file    Highest education level: Not on file   Occupational History    Not on file   Social Needs    Financial resource strain: Not on file    Food insecurity     Worry: Not on file     Inability: Not on file    Transportation needs     Medical: Not on file     Non-medical: Not on file   Tobacco Use    Smoking status: Not on file   Substance and Sexual Activity    Alcohol use: Not on file    Drug use: Not on file    Sexual activity: Not on file   Lifestyle    Physical activity     Days per week: Not on file     Minutes per session: Not on file    Stress: Not on file   Relationships    Social connections     Talks on phone: Not on file     Gets together: Not on file     Attends Sikh service: Not on file     Active member of club or organization: Not on file     Attends meetings of clubs or organizations: Not on file     Relationship status: Not on file    Intimate partner violence     Fear of current or ex partner: Not on file     Emotionally abused: Not on file     Physically abused: Not on file     Forced sexual activity: Not on file   Other Topics Concern    Not on file   Social History Narrative    Not on file         ALLERGIES: Patient has no known allergies. Review of Systems   Constitutional: Negative for chills, diaphoresis and fever. HENT: Negative for congestion and trouble swallowing. Eyes: Negative for photophobia and visual disturbance. Respiratory: Negative for cough, chest tightness and shortness of breath. Cardiovascular: Negative for chest pain, palpitations and leg swelling. Gastrointestinal: Positive for abdominal pain. Negative for diarrhea, nausea and vomiting. Genitourinary: Negative for difficulty urinating, dysuria, flank pain and frequency. Musculoskeletal: Negative for back pain and myalgias. Skin: Negative for rash and wound. Neurological: Negative for dizziness, weakness, light-headedness and headaches. Hematological: Negative for adenopathy. Does not bruise/bleed easily. Psychiatric/Behavioral: Negative for agitation and confusion. All other systems reviewed and are negative. Vitals:    04/14/20 0731   BP: 121/76   Pulse: 98   Resp: 18   Temp: 98.5 °F (36.9 °C)   SpO2: 99%            Physical Exam  Vitals signs and nursing note reviewed. Constitutional:       General: He is not in acute distress. Appearance: He is well-developed. He is not diaphoretic. HENT:      Head: Normocephalic. Eyes:      General: Scleral icterus present. Conjunctiva/sclera: Conjunctivae normal.      Pupils: Pupils are equal, round, and reactive to light. Neck:      Musculoskeletal: Normal range of motion and neck supple. Vascular: No JVD. Cardiovascular:      Rate and Rhythm: Normal rate and regular rhythm. Heart sounds: Normal heart sounds. Pulmonary:      Effort: Pulmonary effort is normal.      Breath sounds: Normal breath sounds. Abdominal:      General: Bowel sounds are normal. There is distension. Palpations: Abdomen is soft.       Tenderness: There is abdominal tenderness in the right upper quadrant, epigastric area and left upper quadrant. Musculoskeletal: Normal range of motion. General: No tenderness or deformity. Lymphadenopathy:      Cervical: No cervical adenopathy. Skin:     General: Skin is warm and dry. Capillary Refill: Capillary refill takes less than 2 seconds. Coloration: Skin is jaundiced. Findings: No erythema or rash. Neurological:      Mental Status: He is alert and oriented to person, place, and time. Cranial Nerves: No cranial nerve deficit. Sensory: No sensory deficit. MDM       Procedures    CONSULT NOTE:  9:33 AM Ambika with surgery at bedside. Bi Benitez MD spoke with Katey Jennings for Surgery. Discussed available diagnostic tests and clinical findings. She will speak to surgeon but thinks he may need HIDA or MRCP or other imaging prior to surgery. CONSULT NOTE:  9:52 AM Bi Benitez MD spoke with Dr. Juan Miguel Figueroa, Consult for Gastroenterology. Discussed available diagnostic tests and clinical findings. Dr. Juan Miguel Figueroa will come see the patient and give further imaging recommendations and recommends admitting to hospitalist.    Hospitalist Cristel Serve for Admission  9:56 AM to NANMichael Floresizaiah Yu    ED Room Number: ER07/07  Patient Name and age:  Lyle Rodriguez 38 y.o.  male  Working Diagnosis:   1. Epigastric abdominal pain    2. Cholecystitis      COVID-19 Suspicion:  no  Readmission: no  Isolation Requirements:  no  Recommended Level of Care:  med/surg  Code Status:  Full Code  Department:[facility] Adult ED - 21   Other:  Surgery and GI both consulted. GI to determine next imaging needed. Surgery would like more imaging also prior to confirming cholecystitis as US findings may be 2/2 to cirrhosis    Pt accepted and to be admitted to Dr. August Dillard.       Florencio Martins MD

## 2020-04-14 NOTE — PROGRESS NOTES
Day #1 of Zosyn  Indication:  possible cholecystitis  Current regimen: 2.25 gm Q 6hr  Abx regimen: -  Recent Labs     20  0746   WBC 8.5   CREA 0.81   BUN 5*     Est CrCl: >50 ml/min;     Temp (24hrs), Av.6 °F (37 °C), Min:98.5 °F (36.9 °C), Max:98.9 °F (37.2 °C)    Cultures: none    Plan: Change to 3.375 gm Q8hr

## 2020-04-14 NOTE — ED TRIAGE NOTES
Triage: Patient arrives ambulatory from home with c/o epigastric pain that began overnight. He reports hx of liver cirrhosis d/t alcohol abuse. Denies vomiting, diarrhea or constipation. Last drink yesterday evening.

## 2020-04-14 NOTE — CONSULTS
[address] [facility], Zackery HENDRIX, Brian Jones MD Zilphia Jury, PA-C Melford Demark, [facility]     Mehreen FELIX Declan [facility]   HUGO Carter [facility]       Grisel Deputado [address]ney [address]    at [address], [address] [address]ño White  22.    [phone]    FAX: [address] [place] [address], [address]    [phone]    FAX: [phone]         HEPATOLOGY CONSULT NOTE  I was asked to see this patient in consultation by Dr Nick Thomas for management of alcohol induced cirrhosis. I have reviewed the Emergency room note, Hospital admission note, Notes by all other physicians who have seen the patient during this hospitalization to date. I have reviewed the problem list and the reason for this hospitalization. I have reviewed the allergies and the medications the patient was taking at home prior to this hospitalization. HISTORY:  The patient is a 38year old  male with a long history of consuming alcohol in excess. For the past 6 months this has been 1 gallon of whisky every 3 days. He found out he had cirrhosis in 1/2020 when he underwent a CT scan after hemroidectomy. He tried to reduce alcohol consumption after finding out he had cirrhosis but develope withdrawal shakes and has been unable to. He developed severe abdominal RUQ pain and came to the ED. He was found to have ascites. Labs showed TBILI 9, ALB 1.6, , HB 11.  A HIDA scan showed no contraction. ASSESSMENT AND PLAN:  Alcohol hepatitis  There is elevation in AST>>ALT consistent with ETOH. There is a history of consuming alcohol in excess. DF can not be calculated because there is no INR.   Will obtain INR in AM.  If DF is borderline will give some vitamin K and see if DF goes down before using steroids     Cirrhosis  Cirrhosis is secondary to alcohol. The diagnosis of cirrhosis is based upon imaging, laboratory studies, complications of cirrhosis. Will obtain serologies to exclude other causes of chronic liver disease. The patient has depressed but stable liver function. The CTP is 10. Child class C. The MELD cannot be calculated correctly because there is no INR. Assuming the INR is normal the MELD is 19. If INR is increased the MELD is even higher and could be in the mid-20s. Ascites   Ascites has recently developed. Will start on IV albumin to support Sna so we can start diuretics. Will start diuretics at step 1. The patient was counseled regarding the need to maintain sodium restriction and the types of foods containing high amounts of sodium to be avoided. Hyponatremia  This is secondary cirrhosis and alcohol abuse. Will give IV albumin 25 gm Q6H to support NA when we start diuretics. Screening for Esophageal varices   The patient has not had an EGD to screen for varices. Will schedule for EGD to assess for varices and need for banding before discharge. Dr Katelynn Lal or myself will do this. Hepatic encephalopathy   Overt HE has not developed to date. There is no need for treatment with lactulose and/or Xifaxan at this time. There is no need to restrict dietary protein at this time. Anemia   This is due to multifactorial causes including portal hypertension with chronic GI blood loss bone marrow suppression secondary to malnutrition and alcohol. Will obtain FE panel to assess for iron stores. Will schedule for EGD to assess for UGI blood loss. Will refer to GI for colonoscopy and or Pill Cam to assess for GI blood loss. Thrombocytopenia   This is secondary to cirrhosis. There is no evidence of overt bleeding. No treatment is required.   The platelet count is adequate for the patient to undergo procedures without the need for platelet transfusion or platelet growth factors. Counseling for alcohol in patients with chronic liver disease  The patient has cirrhosis and was advised to be abstinent from all alcohol including non-alcoholic beer which does contain some alcohol. It was recommended that all alcohol consumption be stopped and the patient be abstinent from alcohol    Risk of elective surgery in a patient with cirrhosis  The patient has cirrhosis Child class C and MELD of greater than 15. The risk of post-operative complications including hepatic decompensation is about 60-80%. Operative mortality risk is 10-30%. SYSTEM REVIEW:  Constitution systems: Negative for fever, chills, weight gain, weight loss. Eyes: Negative for visual changes. ENT: Negative for sore throat, painful swallowing. Respiratory: Negative for cough, hemoptysis, SOB. Cardiology: Negative for chest pain, palpitations. GI:  Abdominal pain has resolved. : Negative for urinary frequency, dysuria, hematuria, nocturia. Skin: Negative for rash. Hematology: Negative for easy bruising, blood clots. Musculo-skelatal: Negative for back pain, muscle pain, weakness. Neurologic: Negative for headaches, dizziness, vertigo, memory problems not related to HE. Psychology: Negative for anxiety, depression. FAMILY HISTORY:  The patient has no knowledge of the father's medical condition. The mother Has/had the following chronic disease(s): None. There is no family history of liver disease. SOCIAL HISTORY:  The patient has never been . The patient has no children. The patient currently smokes 1/2 pack of tobacco daily. The patient consumes alcohol in excess. 1 gallon of whiskey every 3 days. The patient used to work as . The patient has not worked since 1/2020. PHYSICAL EXAMINATION:  VS: per nursing note  General:  No acute distress. Eyes:  Sclera icteric  ENT:  No oral lesions. Thyroid normal.  Nodes:  No adenopathy. Skin:  Spider angiomata. Jaundice. Respiratory:  Lungs clear to auscultation. Cardiovascular:  Regular heart rate. Abdomen:  Soft non-tender, Ascites appears to be present. Extremities:  No lower extremity edema. Neurologic:  Alert and oriented. Cranial nerves grossly intact. No asterixis. LABORATORY:  Results for [place] (MRN [id number]) as of 4/14/2020 19:27   Ref. Range 3/25/2020 12:26 4/14/2020 07:46   WBC Latest Ref Range: 4.1 - 11.1 K/uL 5.7 8.5   HGB Latest Ref Range: 12.1 - 17.0 g/dL 10.7 (L) 10.9 (L)   PLATELET Latest Ref Range: 150 - 400 K/uL 72 (L) 102 (L)   Sodium Latest Ref Range: 136 - 145 mmol/L 134 (L) 133 (L)   Potassium Latest Ref Range: 3.5 - 5.1 mmol/L 3.4 (L) 3.2 (L)   Chloride Latest Ref Range: 97 - 108 mmol/L 100 103   CO2 Latest Ref Range: 21 - 32 mmol/L 29 25   Glucose Latest Ref Range: 65 - 100 mg/dL 103 (H) 99   BUN Latest Ref Range: 6 - 20 MG/DL 5 (L) 5 (L)   Creatinine Latest Ref Range: 0.70 - 1.30 MG/DL 0.84 0.81   Bilirubin, total Latest Ref Range: 0.2 - 1.0 MG/DL 10.6 (H) 8.9 (H)   Albumin Latest Ref Range: 3.5 - 5.0 g/dL 2.0 (L) 1.6 (L)   ALT (SGPT) Latest Ref Range: 12 - 78 U/L 41 37   AST Latest Ref Range: 15 - 37 U/L 242 (H) 126 (H)   Alk. phosphatase Latest Ref Range: 45 - 117 U/L 300 (H) 244 (H)   Lipase Latest Ref Range: 73 - 393 U/L 503 (H) 548 (H)       RADIOLOGY:  Ultrasound of liver. Echogenic consistent with cirrhosis. No liver mass lesions. No dilated bile ducts. Moderate ascites. NO gallstones. HIDA. Tracer uptake and excretion. No gallbladder response to CCK. CT scan abdomen with IV contrast.  Changes consistent with cirrhosis. No liver mass lesions. No dilated bile ducts. Moderate ascites. NOrmal appearing pancreas.       Juan A Michele MD  [address] [place] [address] Ky Coto [address] [place] [address] Fatou

## 2020-04-15 LAB
ALBUMIN SERPL-MCNC: 1.7 G/DL (ref 3.5–5)
ALBUMIN/GLOB SERPL: 0.3 {RATIO} (ref 1.1–2.2)
ALP SERPL-CCNC: 163 U/L (ref 45–117)
ALT SERPL-CCNC: 29 U/L (ref 12–78)
ANION GAP SERPL CALC-SCNC: 6 MMOL/L (ref 5–15)
AST SERPL-CCNC: 99 U/L (ref 15–37)
BASOPHILS # BLD: 0.1 K/UL (ref 0–0.1)
BASOPHILS NFR BLD: 2 % (ref 0–1)
BILIRUB DIRECT SERPL-MCNC: 7.5 MG/DL (ref 0–0.2)
BILIRUB SERPL-MCNC: 12.4 MG/DL (ref 0.2–1)
BUN SERPL-MCNC: 6 MG/DL (ref 6–20)
BUN/CREAT SERPL: 8 (ref 12–20)
CALCIUM SERPL-MCNC: 7.9 MG/DL (ref 8.5–10.1)
CHLORIDE SERPL-SCNC: 103 MMOL/L (ref 97–108)
CO2 SERPL-SCNC: 24 MMOL/L (ref 21–32)
CREAT SERPL-MCNC: 0.76 MG/DL (ref 0.7–1.3)
DIFFERENTIAL METHOD BLD: ABNORMAL
EOSINOPHIL # BLD: 0.2 K/UL (ref 0–0.4)
EOSINOPHIL NFR BLD: 3 % (ref 0–7)
ERYTHROCYTE [DISTWIDTH] IN BLOOD BY AUTOMATED COUNT: 17.3 % (ref 11.5–14.5)
FERRITIN SERPL-MCNC: 253 NG/ML (ref 26–388)
GLOBULIN SER CALC-MCNC: 5.1 G/DL (ref 2–4)
GLUCOSE SERPL-MCNC: 82 MG/DL (ref 65–100)
HBV SURFACE AB SER QL: NONREACTIVE
HBV SURFACE AB SER-ACNC: 4.08 MIU/ML
HBV SURFACE AG SER QL: 0.32 INDEX
HBV SURFACE AG SER QL: NEGATIVE
HCT VFR BLD AUTO: 28.9 % (ref 36.6–50.3)
HCV AB SERPL QL IA: NONREACTIVE
HCV COMMENT,HCGAC: NORMAL
HGB BLD-MCNC: 10 G/DL (ref 12.1–17)
IMM GRANULOCYTES # BLD AUTO: 0 K/UL (ref 0–0.04)
IMM GRANULOCYTES NFR BLD AUTO: 0 % (ref 0–0.5)
INR PPP: 2.3 (ref 0.9–1.1)
IRON SATN MFR SERPL: 82 % (ref 20–50)
IRON SERPL-MCNC: 108 UG/DL (ref 35–150)
LIPASE SERPL-CCNC: 312 U/L (ref 73–393)
LYMPHOCYTES # BLD: 1.6 K/UL (ref 0.8–3.5)
LYMPHOCYTES NFR BLD: 22 % (ref 12–49)
MCH RBC QN AUTO: 35.3 PG (ref 26–34)
MCHC RBC AUTO-ENTMCNC: 34.6 G/DL (ref 30–36.5)
MCV RBC AUTO: 102.1 FL (ref 80–99)
MONOCYTES # BLD: 0.7 K/UL (ref 0–1)
MONOCYTES NFR BLD: 10 % (ref 5–13)
NEUTS SEG # BLD: 4.6 K/UL (ref 1.8–8)
NEUTS SEG NFR BLD: 63 % (ref 32–75)
NRBC # BLD: 0 K/UL (ref 0–0.01)
NRBC BLD-RTO: 0 PER 100 WBC
PLATELET # BLD AUTO: 84 K/UL (ref 150–400)
PMV BLD AUTO: 11 FL (ref 8.9–12.9)
POTASSIUM SERPL-SCNC: 3.9 MMOL/L (ref 3.5–5.1)
PROT SERPL-MCNC: 6.8 G/DL (ref 6.4–8.2)
PROTHROMBIN TIME: 22 SEC (ref 9–11.1)
RBC # BLD AUTO: 2.83 M/UL (ref 4.1–5.7)
RBC MORPH BLD: ABNORMAL
RBC MORPH BLD: ABNORMAL
SODIUM SERPL-SCNC: 133 MMOL/L (ref 136–145)
TIBC SERPL-MCNC: 131 UG/DL (ref 250–450)
WBC # BLD AUTO: 7.2 K/UL (ref 4.1–11.1)

## 2020-04-15 PROCEDURE — 83540 ASSAY OF IRON: CPT

## 2020-04-15 PROCEDURE — 65270000032 HC RM SEMIPRIVATE

## 2020-04-15 PROCEDURE — 74011000250 HC RX REV CODE- 250: Performed by: HOSPITALIST

## 2020-04-15 PROCEDURE — 86708 HEPATITIS A ANTIBODY: CPT

## 2020-04-15 PROCEDURE — 74011000258 HC RX REV CODE- 258: Performed by: HOSPITALIST

## 2020-04-15 PROCEDURE — 74011250636 HC RX REV CODE- 250/636: Performed by: INTERNAL MEDICINE

## 2020-04-15 PROCEDURE — 82248 BILIRUBIN DIRECT: CPT

## 2020-04-15 PROCEDURE — 86803 HEPATITIS C AB TEST: CPT

## 2020-04-15 PROCEDURE — 36415 COLL VENOUS BLD VENIPUNCTURE: CPT

## 2020-04-15 PROCEDURE — 80053 COMPREHEN METABOLIC PANEL: CPT

## 2020-04-15 PROCEDURE — 85025 COMPLETE CBC W/AUTO DIFF WBC: CPT

## 2020-04-15 PROCEDURE — 87340 HEPATITIS B SURFACE AG IA: CPT

## 2020-04-15 PROCEDURE — 85610 PROTHROMBIN TIME: CPT

## 2020-04-15 PROCEDURE — 86706 HEP B SURFACE ANTIBODY: CPT

## 2020-04-15 PROCEDURE — P9047 ALBUMIN (HUMAN), 25%, 50ML: HCPCS | Performed by: INTERNAL MEDICINE

## 2020-04-15 PROCEDURE — C9113 INJ PANTOPRAZOLE SODIUM, VIA: HCPCS | Performed by: HOSPITALIST

## 2020-04-15 PROCEDURE — 74011250637 HC RX REV CODE- 250/637: Performed by: INTERNAL MEDICINE

## 2020-04-15 PROCEDURE — 74011250637 HC RX REV CODE- 250/637: Performed by: HOSPITALIST

## 2020-04-15 PROCEDURE — 74011250636 HC RX REV CODE- 250/636: Performed by: HOSPITALIST

## 2020-04-15 PROCEDURE — 83690 ASSAY OF LIPASE: CPT

## 2020-04-15 PROCEDURE — 82728 ASSAY OF FERRITIN: CPT

## 2020-04-15 PROCEDURE — 74011250637 HC RX REV CODE- 250/637: Performed by: NURSE PRACTITIONER

## 2020-04-15 PROCEDURE — 86704 HEP B CORE ANTIBODY TOTAL: CPT

## 2020-04-15 RX ORDER — IBUPROFEN 200 MG
1 TABLET ORAL DAILY
Status: DISCONTINUED | OUTPATIENT
Start: 2020-04-15 | End: 2020-04-18 | Stop reason: HOSPADM

## 2020-04-15 RX ORDER — LANOLIN ALCOHOL/MO/W.PET/CERES
1 CREAM (GRAM) TOPICAL 2 TIMES DAILY WITH MEALS
Status: DISCONTINUED | OUTPATIENT
Start: 2020-04-15 | End: 2020-04-18 | Stop reason: HOSPADM

## 2020-04-15 RX ORDER — LANOLIN ALCOHOL/MO/W.PET/CERES
100 CREAM (GRAM) TOPICAL DAILY
Status: DISCONTINUED | OUTPATIENT
Start: 2020-04-15 | End: 2020-04-18 | Stop reason: HOSPADM

## 2020-04-15 RX ORDER — FOLIC ACID 1 MG/1
1 TABLET ORAL DAILY
Status: DISCONTINUED | OUTPATIENT
Start: 2020-04-15 | End: 2020-04-18 | Stop reason: HOSPADM

## 2020-04-15 RX ADMIN — SPIRONOLACTONE 100 MG: 100 TABLET ORAL at 11:02

## 2020-04-15 RX ADMIN — Medication 10 ML: at 23:11

## 2020-04-15 RX ADMIN — Medication 10 ML: at 07:23

## 2020-04-15 RX ADMIN — SODIUM CHLORIDE 40 MG: 9 INJECTION INTRAMUSCULAR; INTRAVENOUS; SUBCUTANEOUS at 07:23

## 2020-04-15 RX ADMIN — Medication 100 MG: at 13:12

## 2020-04-15 RX ADMIN — Medication 10 ML: at 17:20

## 2020-04-15 RX ADMIN — ALBUMIN (HUMAN) 25 G: 0.25 INJECTION, SOLUTION INTRAVENOUS at 07:26

## 2020-04-15 RX ADMIN — SODIUM CHLORIDE 3.38 G: 900 INJECTION, SOLUTION INTRAVENOUS at 04:37

## 2020-04-15 RX ADMIN — FUROSEMIDE 40 MG: 40 TABLET ORAL at 11:02

## 2020-04-15 RX ADMIN — FERROUS SULFATE TAB 325 MG (65 MG ELEMENTAL FE) 325 MG: 325 (65 FE) TAB at 17:19

## 2020-04-15 RX ADMIN — ALBUMIN (HUMAN) 25 G: 0.25 INJECTION, SOLUTION INTRAVENOUS at 17:22

## 2020-04-15 RX ADMIN — LORAZEPAM 2 MG: 2 INJECTION INTRAMUSCULAR; INTRAVENOUS at 20:10

## 2020-04-15 RX ADMIN — SODIUM CHLORIDE 40 MG: 9 INJECTION INTRAMUSCULAR; INTRAVENOUS; SUBCUTANEOUS at 19:22

## 2020-04-15 RX ADMIN — ALBUMIN (HUMAN) 25 G: 0.25 INJECTION, SOLUTION INTRAVENOUS at 13:12

## 2020-04-15 RX ADMIN — FOLIC ACID 1 MG: 1 TABLET ORAL at 13:12

## 2020-04-15 RX ADMIN — ALBUMIN (HUMAN) 25 G: 0.25 INJECTION, SOLUTION INTRAVENOUS at 23:14

## 2020-04-15 RX ADMIN — ALBUMIN (HUMAN) 25 G: 0.25 INJECTION, SOLUTION INTRAVENOUS at 00:37

## 2020-04-15 NOTE — PROGRESS NOTES
Bedside and Verbal shift change report given to Trice (oncoming nurse) by Tejinder (offgoing nurse). Report included the following information SBAR, Kardex and MAR.

## 2020-04-15 NOTE — PROGRESS NOTES
Bedside shift change report given to Marybeth Bingham (oncoming nurse) by Osvaldo Vazquez (offgoing nurse). Report included the following information SBAR, Kardex, MAR and Recent Results.

## 2020-04-15 NOTE — PROGRESS NOTES
Bedside shift change report given to Tan Alfaro (oncoming nurse) by [facility], RN (offgoing nurse). Report included the following information SBAR, Kardex, MAR and Recent Results.

## 2020-04-15 NOTE — PROGRESS NOTES
Na [address]hya [address]  [address] [facility], [address]       GI PROGRESS NOTE  [facility] office  [phone] NP in-hospital cell phone M-F until 4:30  After 5pm or on weekends, please call  for physician on call      NAME: Jose Bautista   :  1975   MRN:  [id number]       Subjective:   He has resolution of RUQ pain, no nausea, tolerating CLD. Objective:     VITALS:   Last 24hrs VS reviewed since prior progress note. Most recent are:  Visit Vitals  /71 (BP 1 Location: Right arm, BP Patient Position: At rest)   Pulse 73   Temp 98.3 °F (36.8 °C)   Resp 14   Ht 5' 9\" (1.753 m)   Wt 77.6 kg (171 lb 1.6 oz)   SpO2 94%   BMI 25.27 kg/m²       PHYSICAL EXAM:  General: Cooperative, jaundice, no acute distress    Neurologic:  Alert and oriented X 3. HEENT: EOMI    Lungs:  CTA bilaterally. No wheezing  Heart:  S1 S2   Abdomen: Soft, non-distended, no tenderness. +Bowel sounds  Extremities: warm  Psych:   Good insight. Not anxious or agitated.     Lab Data Reviewed:     Recent Results (from the past 24 hour(s))   CULTURE, BLOOD, PAIRED    Collection Time: 20 12:41 PM   Result Value Ref Range    Special Requests: NO SPECIAL REQUESTS      Culture result: NO GROWTH AFTER 15 HOURS     LACTIC ACID    Collection Time: 20  1:09 PM   Result Value Ref Range    Lactic acid 2.0 0.4 - 2.0 MMOL/L   METABOLIC PANEL, COMPREHENSIVE    Collection Time: 04/15/20  6:12 AM   Result Value Ref Range    Sodium 133 (L) 136 - 145 mmol/L    Potassium 3.9 3.5 - 5.1 mmol/L    Chloride 103 97 - 108 mmol/L    CO2 24 21 - 32 mmol/L    Anion gap 6 5 - 15 mmol/L    Glucose 82 65 - 100 mg/dL    BUN 6 6 - 20 MG/DL    Creatinine 0.76 0.70 - 1.30 MG/DL    BUN/Creatinine ratio 8 (L) 12 - 20      GFR est AA >60 >60 ml/min/1.73m2    GFR est non-AA >60 >60 ml/min/1.73m2    Calcium 7.9 (L) 8.5 - 10.1 MG/DL    Bilirubin, total 12.4 (H) 0.2 - 1.0 MG/DL    ALT (SGPT) 29 12 - 78 U/L    AST (SGOT) 99 (H) 15 - 37 U/L    Alk. phosphatase 163 (H) 45 - 117 U/L    Protein, total 6.8 6.4 - 8.2 g/dL    Albumin 1.7 (L) 3.5 - 5.0 g/dL    Globulin 5.1 (H) 2.0 - 4.0 g/dL    A-G Ratio 0.3 (L) 1.1 - 2.2     CBC WITH AUTOMATED DIFF    Collection Time: 04/15/20  6:12 AM   Result Value Ref Range    WBC 7.2 4.1 - 11.1 K/uL    RBC 2.83 (L) 4.10 - 5.70 M/uL    HGB 10.0 (L) 12.1 - 17.0 g/dL    HCT 28.9 (L) 36.6 - 50.3 %    .1 (H) 80.0 - 99.0 FL    MCH 35.3 (H) 26.0 - 34.0 PG    MCHC 34.6 30.0 - 36.5 g/dL    RDW 17.3 (H) 11.5 - 14.5 %    PLATELET 84 (L) 612 - 400 K/uL    MPV 11.0 8.9 - 12.9 FL    NRBC 0.0 0  WBC    ABSOLUTE NRBC 0.00 0.00 - 0.01 K/uL    NEUTROPHILS 63 32 - 75 %    LYMPHOCYTES 22 12 - 49 %    MONOCYTES 10 5 - 13 %    EOSINOPHILS 3 0 - 7 %    BASOPHILS 2 (H) 0 - 1 %    IMMATURE GRANULOCYTES 0 0.0 - 0.5 %    ABS. NEUTROPHILS 4.6 1.8 - 8.0 K/UL    ABS. LYMPHOCYTES 1.6 0.8 - 3.5 K/UL    ABS. MONOCYTES 0.7 0.0 - 1.0 K/UL    ABS. EOSINOPHILS 0.2 0.0 - 0.4 K/UL    ABS. BASOPHILS 0.1 0.0 - 0.1 K/UL    ABS. IMM. GRANS. 0.0 0.00 - 0.04 K/UL    DF SMEAR SCANNED      RBC COMMENTS ANISOCYTOSIS  1+        RBC COMMENTS MACROCYTOSIS  1+       PROTHROMBIN TIME + INR    Collection Time: 04/15/20  6:12 AM   Result Value Ref Range    INR 2.3 (H) 0.9 - 1.1      Prothrombin time 22.0 (H) 9.0 - 11.1 sec   BILIRUBIN, DIRECT    Collection Time: 04/15/20  6:12 AM   Result Value Ref Range    Bilirubin, direct 7.5 (H) 0.0 - 0.2 MG/DL   HEP B SURFACE AG    Collection Time: 04/15/20  6:12 AM   Result Value Ref Range    Hepatitis B surface Ag 0.32 Index    Hep B surface Ag Interp. Negative NEG     HEP B SURFACE AB    Collection Time: 04/15/20  6:12 AM   Result Value Ref Range    Hepatitis B surface Ab 4.08 mIU/mL    Hep B surface Ab Interp. NONREACTIVE NR     HEPATITIS C AB    Collection Time: 04/15/20  6:12 AM   Result Value Ref Range    Hep C  virus Ab Interp.  NONREACTIVE NR      Hep C  virus Ab comment Method used is Siemens Advia Centaur     FERRITIN    Collection Time: 04/15/20  6:12 AM   Result Value Ref Range    Ferritin 253 26 - 388 NG/ML   IRON PROFILE    Collection Time: 04/15/20  6:12 AM   Result Value Ref Range    Iron 108 35 - 150 ug/dL    TIBC 131 (L) 250 - 450 ug/dL    Iron % saturation 82 (H) 20 - 50 %   LIPASE    Collection Time: 04/15/20  6:12 AM   Result Value Ref Range    Lipase 312 73 - 393 U/L            Assessment:   · RUQ pain: biliary dyskinesia, alk phos 163, t bili 12.4, AST 99, ALT 29   · ETOH cirrhosis     Patient Active Problem List   Diagnosis Code    Cholecystitis K81.9    Epigastric pain R10.13     Plan:   · Monitor LFT's  · Further plans per Dr. Erum Vallejo  · Will be available to see again on request     Signed By: Pollo Muniz NP     4/15/2020  9:55 AM        This patient was seen and examined by me in a face-to-face visit today. I reviewed the medical record including lab work, imaging and other provider notes. I confirmed the interval history as described above. I spoke to the patient, discussing our findings and plans. I discussed this case in detail with King MEJIA. I formulated an updated  assessment of this patient and guided our treatment plan. I agree with the above progress note. I agree with the history, exam and assessment and plan as outlined in the note. I would like to add the following:     Abd: normoactive BS, nt, nd, no rebound/guarding. Elevated liver tests due to EtOH abuse. Will need long term follow-up with Hepatology: MELD 27. Defer EGD and timing to Dr. Erum Vallejo for varices. Will sign off. Please call with any questions. Outpatient GI follow-up with Dr. Trevor Burgos if needed.     Dr. Garett Zhang

## 2020-04-15 NOTE — PROGRESS NOTES
Occupational Therapy  Order received and chart reviewed, screened patient, bathing independently on arrival, reports no OT needs at this time. Will sign off.    Roselia Souza, OTR/L

## 2020-04-15 NOTE — PROGRESS NOTES
[facility]  Hospitalist Group                                                                                          Hospitalist Progress Note  Nora Lazar MD  Answering service: [phone] from in house phone        Date of Service:  4/15/2020  NAME:  Carolyn Hastings  :  1975  MRN:  [id number]      Admission Summary: This is a 54-year-old male patient with past medical history significant for alcoholic liver cirrhosis, alcohol and tobacco abuse, presented to [address] [place] [address] Emergency Department with epigastric pain. Interval history / Subjective:     He said abdominal pain improving, no vomiting     Assessment & Plan:     Epigastric pain possible due to biliary dyskinesia and acute pancreatitis   -HIDA there is no significant emptying of the gallbladder after CCK administration  -lipase 548  -no evidence of acute cholecystitis, discontinue iv zosyn  -continue IVF, Protonix, prn zofran  -Surgical Service on board said laparoscopic cholecystectomy is not an option for him with his severe liver failure meld score 27 in my calculation,Sometime in the future could consider cholecystectomy if he is continuing to have some pain issues      Jaundice secondary to alcoholic hepatitis , liver cirrhosis  -bilirubin trending up   -US gallbladder distention with mild wall thickening and focal tenderness but no calculi, There are no intraluminal calculi. . The intra and extrahepatic biliary ductal systems are normal and there is no focal hepatic parenchymal abnormality  -GI and Hepatologist on board    Alcoholic liver cirrhosis   -liver enzyme improving  -continue lasix, spironolactone, albumin     Anemia of chronic disease   -H/H stable  -add ferrous sulfate  -iron and ferritin level are normal, iron saturation low     Hyponatremia   -stable  -monitor electrolyte     Hypokalemia   -replaced with kcl and resolved    Alcohol abuse   -advised to stop smoking,   -on IVF folic acid, thiamine, mvi and vitamin k    Thrombocytopenia   -platelet trending down. PLT 84, no evidence of bleeding, monitor platelet           Code status: Full Code   DVT prophylaxis: SCD    Care Plan discussed with: Patient/Family and Nurse  Anticipated Disposition: Home w/Family  Anticipated Discharge: 24 hours to 48 hours     Hospital Problems  Date Reviewed: 4/14/2020          Codes Class Noted POA    Cholecystitis ICD-10-CM: K81.9  ICD-9-CM: 575.10  4/14/2020 Unknown        * (Principal) Epigastric pain ICD-10-CM: R10.13  ICD-9-CM: 789.06  4/14/2020 Unknown                Vital Signs:    Last 24hrs VS reviewed since prior progress note. Most recent are:  Visit Vitals  /71 (BP 1 Location: Right arm, BP Patient Position: At rest)   Pulse 73   Temp 98.3 °F (36.8 °C)   Resp 14   Ht 5' 9\" (1.753 m)   Wt 77.6 kg (171 lb 1.6 oz)   SpO2 94%   BMI 25.27 kg/m²         Intake/Output Summary (Last 24 hours) at 4/15/2020 1007  Last data filed at 4/14/2020 1827  Gross per 24 hour   Intake 640 ml   Output    Net 640 ml        Physical Examination:             Constitutional:  No acute distress, cooperative, pleasant    ENT:  Icteric sclera, oral mucosa moist, oropharynx benign. Resp:  CTA bilaterally. No wheezing/rhonchi/rales. No accessory muscle use   CV:  Regular rhythm, normal rate, no murmurs, gallops, rubs    GI:  Soft, non distended, non tender. normoactive bowel sounds, no hepatosplenomegaly     Musculoskeletal:  No edema     Neurologic:  Moves all extremities.   AAOx3, CN II-XII reviewed     Skin:  Jaundice       Data Review:    Review and/or order of clinical lab test  Review and/or order of tests in the radiology section of CPT  Review and/or order of tests in the medicine section of CPT      Labs:     Recent Labs     04/15/20  0612 04/14/20  0746   WBC 7.2 8.5   HGB 10.0* 10.9*   HCT 28.9* 31.8*   PLT 84* 102*     Recent Labs     04/15/20  0612 04/14/20  0746   * 133*   K 3.9 3.2*    103   CO2 24 25 BUN 6 5*   CREA 0.76 0.81   GLU 82 99   CA 7.9* 8.1*   MG  --  1.9     Recent Labs     04/15/20  0612 04/14/20  0746   SGOT 99* 126*   ALT 29 37   * 244*   TBILI 12.4* 8.9*   TP 6.8 8.2   ALB 1.7* 1.6*   GLOB 5.1* 6.6*   LPSE 312 548*     Recent Labs     04/15/20  0612   INR 2.3*   PTP 22.0*      Recent Labs     04/15/20  0612   TIBC 131*   PSAT 82*   FERR 253      No results found for: FOL, RBCF   No results for input(s): PH, PCO2, PO2 in the last 72 hours. No results for input(s): CPK, CKNDX, TROIQ in the last 72 hours. No lab exists for component: CPKMB  No results found for: CHOL, CHOLX, CHLST, CHOLV, HDL, HDLP, LDL, LDLC, DLDLP, TGLX, TRIGL, TRIGP, CHHD, CHHDX  No results found for: GLUCPOC  No results found for: COLOR, APPRN, SPGRU, REFSG, RENATE, PROTU, GLUCU, KETU, BILU, UROU, FLORIAN, LEUKU, GLUKE, EPSU, BACTU, WBCU, RBCU, CASTS, UCRY      Medications Reviewed:     Current Facility-Administered Medications   Medication Dose Route Frequency    sodium chloride (NS) flush 5-40 mL  5-40 mL IntraVENous Q8H    sodium chloride (NS) flush 5-40 mL  5-40 mL IntraVENous PRN    oxyCODONE-acetaminophen (PERCOCET) 5-325 mg per tablet 1 Tab  1 Tab Oral Q4H PRN    naloxone (NARCAN) injection 0.4 mg  0.4 mg IntraVENous PRN    ondansetron (ZOFRAN) injection 4 mg  4 mg IntraVENous Q4H PRN    pantoprazole (PROTONIX) 40 mg in 0.9% sodium chloride 10 mL injection  40 mg IntraVENous Q12H    LORazepam (ATIVAN) injection 2 mg  2 mg IntraVENous Q1H PRN    LORazepam (ATIVAN) injection 4 mg  4 mg IntraVENous Q1H PRN    0.9% sodium chloride 3,311 mL with folic acid 1 mg, mvi, adult no. 4 with vit K 10 mL infusion   IntraVENous Q24H    piperacillin-tazobactam (ZOSYN) 3.375 g in 0.9% sodium chloride (MBP/ADV) 100 mL  3.375 g IntraVENous Q8H    albumin human 25% (BUMINATE) solution 25 g  25 g IntraVENous Q6H    furosemide (LASIX) tablet 40 mg  40 mg Oral DAILY    spironolactone (ALDACTONE) tablet 100 mg  100 mg Oral DAILY     ______________________________________________________________________  EXPECTED LENGTH OF STAY: 4d 9h  ACTUAL LENGTH OF STAY:          1                 Myra Guidry MD

## 2020-04-15 NOTE — PROGRESS NOTES
Bedside shift change report given to per (oncoming nurse) by hayden (offgoing nurse). Report included the following information SBAR, Kardex and MAR.

## 2020-04-15 NOTE — PROGRESS NOTES
[facility] at [facility]        Subjective     Overall feeling a little better, tolerating clears, minimal to no abdominal pain    Objective     Patient Vitals for the past 24 hrs:   Temp Pulse Resp BP SpO2   04/15/20 0751 98.3 °F (36.8 °C) 73 14 118/71 94 %   04/15/20 0234 98.6 °F (37 °C) 73 14 116/70 94 %   04/14/20 1953 97.7 °F (36.5 °C) 90 16 138/75 94 %   04/14/20 1708 98.9 °F (37.2 °C) 81 16 119/61 95 %   04/14/20 1159 98.9 °F (37.2 °C) 78 16 134/80 96 %   04/14/20 1100 98.5 °F (36.9 °C) 80  111/60 97 %   04/14/20 1045    110/66 99 %   04/14/20 1000    117/67 97 %   04/14/20 0930    127/68 97 %       Date 04/14/20 0700 - 04/15/20 0659 04/15/20 0700 - 04/16/20 0659   Shift 1406-8346 7110-4197 24 Hour Total 3851-1518 6427-2269 24 Hour Total   INTAKE   P.O. 640  640        P. O. 640  640      I. V.(mL/kg/hr) 500(0.5)  500(0.3)        Volume (sodium chloride 0.9 % bolus infusion 500 mL) 500  500      Shift Total(mL/kg) 1140(14.4)  1140(14.7)      OUTPUT   Urine(mL/kg/hr)           Urine Occurrence(s) 2 x 1 x 3 x      Stool           Stool Occurrence(s) 2 x  2 x      Shift Total(mL/kg)         NET 1140  1140      Weight (kg) 79.4 77.6 77.6 77.6 77.6 77.6       PE  Pulm - CTAB  CV - RRR  Abd -soft, nondistended, bowel sounds present, nontender to palpation    Labs  Recent Results (from the past 12 hour(s))   METABOLIC PANEL, COMPREHENSIVE    Collection Time: 04/15/20  6:12 AM   Result Value Ref Range    Sodium 133 (L) 136 - 145 mmol/L    Potassium 3.9 3.5 - 5.1 mmol/L    Chloride 103 97 - 108 mmol/L    CO2 24 21 - 32 mmol/L    Anion gap 6 5 - 15 mmol/L    Glucose 82 65 - 100 mg/dL    BUN 6 6 - 20 MG/DL    Creatinine 0.76 0.70 - 1.30 MG/DL    BUN/Creatinine ratio 8 (L) 12 - 20      GFR est AA >60 >60 ml/min/1.73m2    GFR est non-AA >60 >60 ml/min/1.73m2    Calcium 7.9 (L) 8.5 - 10.1 MG/DL    Bilirubin, total 12.4 (H) 0.2 - 1.0 MG/DL    ALT (SGPT) 29 12 - 78 U/L    AST (SGOT) 99 (H) 15 - 37 U/L    Alk. phosphatase 163 (H) 45 - 117 U/L    Protein, total 6.8 6.4 - 8.2 g/dL    Albumin 1.7 (L) 3.5 - 5.0 g/dL    Globulin 5.1 (H) 2.0 - 4.0 g/dL    A-G Ratio 0.3 (L) 1.1 - 2.2     CBC WITH AUTOMATED DIFF    Collection Time: 04/15/20  6:12 AM   Result Value Ref Range    WBC 7.2 4.1 - 11.1 K/uL    RBC 2.83 (L) 4.10 - 5.70 M/uL    HGB 10.0 (L) 12.1 - 17.0 g/dL    HCT 28.9 (L) 36.6 - 50.3 %    .1 (H) 80.0 - 99.0 FL    MCH 35.3 (H) 26.0 - 34.0 PG    MCHC 34.6 30.0 - 36.5 g/dL    RDW 17.3 (H) 11.5 - 14.5 %    PLATELET 84 (L) 323 - 400 K/uL    MPV 11.0 8.9 - 12.9 FL    NRBC 0.0 0  WBC    ABSOLUTE NRBC 0.00 0.00 - 0.01 K/uL    NEUTROPHILS 63 32 - 75 %    LYMPHOCYTES 22 12 - 49 %    MONOCYTES 10 5 - 13 %    EOSINOPHILS 3 0 - 7 %    BASOPHILS 2 (H) 0 - 1 %    IMMATURE GRANULOCYTES 0 0.0 - 0.5 %    ABS. NEUTROPHILS 4.6 1.8 - 8.0 K/UL    ABS. LYMPHOCYTES 1.6 0.8 - 3.5 K/UL    ABS. MONOCYTES 0.7 0.0 - 1.0 K/UL    ABS. EOSINOPHILS 0.2 0.0 - 0.4 K/UL    ABS. BASOPHILS 0.1 0.0 - 0.1 K/UL    ABS. IMM. GRANS. 0.0 0.00 - 0.04 K/UL    DF SMEAR SCANNED      RBC COMMENTS ANISOCYTOSIS  1+        RBC COMMENTS MACROCYTOSIS  1+       PROTHROMBIN TIME + INR    Collection Time: 04/15/20  6:12 AM   Result Value Ref Range    INR 2.3 (H) 0.9 - 1.1      Prothrombin time 22.0 (H) 9.0 - 11.1 sec   BILIRUBIN, DIRECT    Collection Time: 04/15/20  6:12 AM   Result Value Ref Range    Bilirubin, direct 7.5 (H) 0.0 - 0.2 MG/DL   HEP B SURFACE AG    Collection Time: 04/15/20  6:12 AM   Result Value Ref Range    Hepatitis B surface Ag 0.32 Index    Hep B surface Ag Interp. Negative NEG     HEP B SURFACE AB    Collection Time: 04/15/20  6:12 AM   Result Value Ref Range    Hepatitis B surface Ab 4.08 mIU/mL    Hep B surface Ab Interp. NONREACTIVE NR     HEPATITIS C AB    Collection Time: 04/15/20  6:12 AM   Result Value Ref Range    Hep C  virus Ab Interp.  NONREACTIVE NR Hep C  virus Ab comment Method used is Siemens Advia Centaur     FERRITIN    Collection Time: 04/15/20  6:12 AM   Result Value Ref Range    Ferritin 253 26 - 388 NG/ML   IRON PROFILE    Collection Time: 04/15/20  6:12 AM   Result Value Ref Range    Iron 108 35 - 150 ug/dL    TIBC 131 (L) 250 - 450 ug/dL    Iron % saturation 82 (H) 20 - 50 %   LIPASE    Collection Time: 04/15/20  6:12 AM   Result Value Ref Range    Lipase 312 73 - 393 U/L         Assessment     Lyle Rodriguez is a 38 y. o.yr old male with alcoholic cirrhosis, biliary dyskinesia, pancreatitis    Plan     Laparoscopic cholecystectomy is not an option for him with his severe liver failure meld score 27 in my calculation   He is now pain-free and does not have any right upper quadrant pain  Sometime in the future could consider cholecystectomy if he is continuing to have some pain issues  He does not have any cholecystitis and does not need emergent surgery  For now I will sign off please call if any other questions or concerns  Continue treatment for liver failure  Follow-up in my office IVETH Irvin MD

## 2020-04-15 NOTE — PROGRESS NOTES
NUTRITION COMPLETE ASSESSMENT    RECOMMENDATIONS:   1. Diet advancement to low fat, 2gm Na diet   2. Add daily MVI with ETOH abuse hx  3. Daily weights with ascites noted     Interventions/Plan:   Food/Nutrient Delivery:    Commercial supplement(Ensure Clear TID)          Assessment:   Reason for Assessment: MST    Diet: Clear liquids   Supplements: None  Nutritionally Significant Medications: [x] Reviewed & Includes: albumin, iron, folic acid, lasix, protonix, spironolactone, thiamine  Meal Intake:   Patient Vitals for the past 100 hrs:   % Diet Eaten   04/14/20 1827 100 %     Current Hospitalization:   Appetite: Good - on clears  PO Ability: Independent Average po intake:%(of clear liquids)  Average supplements intake:        Subjective: Assessment completed by chart review    Objective:  Pt admitted for and pain. PMHx: recent dx of cirrhosis, ETOH abuse. Biliary dyskinesia noted along with pancreatitis. Seen by surgery and GI, no plans for surgical intervention d/t high risk with underlying cirrhosis. Plan per hepatology. Hyponatremia, ? dilutional with ascites. Diuresis rx. Predict poor nutrition quality of life with ETOH abuse noted and abd pain PTA. Tolerating clear liquids this morning. Will add Ensure Clear TID (720kcal, 24g protein) for now. Recommend low fat, 2gm Na diet once advanced to solids. MST reporting 15# wt loss but no recent records available. Ascites present so also hard to assess lean body mass due to this. Wt Readings from Last 10 Encounters:   04/15/20 77.6 kg (171 lb 1.6 oz)     Estimated Nutrition Needs:   Kcals/day: 7773 Kcals/day(1772-1920kcal)  Protein: 93 g(93-108g (1.2-1.4g/kg))  Fluid: 1800 ml(1ml/kcal)  Based On: [place] Jeor(x 1.2-1.3)  Weight Used: Actual wt(77.6kg)    Pt expected to meet estimated nutrient needs:  []   Yes     []  No [x] Unable to predict at this time  Nutrition Diagnosis:   1.  Inadequate protein-energy intake related to altered GI fx as evidenced by pancreatits; clear liquids    2. Excess alcohol intake related to ETOH abuse as evidenced by 1 gallon whiskey x 3 days prior to admit     3. (Decrease sodium needs) related to cirrhosis as evidenced by ascites    Goals:     Diet advancement to at least full liquids in 2-3 days with >75% meals consumed     Monitoring & Evaluation:    - Total energy intake, Liquid meal replacement, Protein intake   - Weight/weight change, Electrolyte and renal profile, GI   -      Previous Nutrition Goals Met:   N/A  Previous Recommendations:    N/A    Education & Discharge Needs:   [] None Identified   [x] Identified - added to d/c paperwork   [] Participated in care plan, discharge planning, and/or interdisciplinary rounds        Nutrition Discharge Plan:   [] Too soon to determine  [x] Other: Low fat and low sodium diet education added to d/c paperwork. Cultural, Taoist and ethnic food preferences identified: None    Skin Integrity: [x]Intact  []Other  Edema: []None [x]Other: ascites  Last BM: 4/14 - loose  Food Allergies: [x]None []Other  Diet Restrictions: Cultural/Judaism Preference(s): None     Anthropometrics:    Weight Loss Metrics 4/15/2020   Today's Wt 171 lb 1.6 oz   BMI 25.27 kg/m2      Weight Source: Bed  Height: 5' 9\" (175.3 cm),    Body mass index is 25.27 kg/m².      IBW : 72.6 kg (160 lb), % IBW (Calculated): 106.94 %   ,      Labs:    Lab Results   Component Value Date/Time    Sodium 133 (L) 04/15/2020 06:12 AM    Potassium 3.9 04/15/2020 06:12 AM    Chloride 103 04/15/2020 06:12 AM    CO2 24 04/15/2020 06:12 AM    Glucose 82 04/15/2020 06:12 AM    BUN 6 04/15/2020 06:12 AM    Creatinine 0.76 04/15/2020 06:12 AM    Calcium 7.9 (L) 04/15/2020 06:12 AM    Magnesium 1.9 04/14/2020 07:46 AM    Albumin 1.7 (L) 04/15/2020 06:12 AM     No results found for: HBA1C, HGBE8, [id number], PMM5MFIU, [id number]    Linn Bower RD [facility], Pager #[phone] or via [employer]

## 2020-04-16 ENCOUNTER — ANESTHESIA EVENT (OUTPATIENT)
Dept: ENDOSCOPY | Age: 45
DRG: 444 | End: 2020-04-16
Payer: COMMERCIAL

## 2020-04-16 ENCOUNTER — ANESTHESIA (OUTPATIENT)
Dept: ENDOSCOPY | Age: 45
DRG: 444 | End: 2020-04-16
Payer: COMMERCIAL

## 2020-04-16 LAB
ALBUMIN SERPL-MCNC: 2.7 G/DL (ref 3.5–5)
ALBUMIN/GLOB SERPL: 0.6 {RATIO} (ref 1.1–2.2)
ALP SERPL-CCNC: 135 U/L (ref 45–117)
ALT SERPL-CCNC: 22 U/L (ref 12–78)
ANION GAP SERPL CALC-SCNC: 8 MMOL/L (ref 5–15)
AST SERPL-CCNC: 71 U/L (ref 15–37)
BILIRUB SERPL-MCNC: 12.8 MG/DL (ref 0.2–1)
BUN SERPL-MCNC: 7 MG/DL (ref 6–20)
BUN/CREAT SERPL: 8 (ref 12–20)
CALCIUM SERPL-MCNC: 8.4 MG/DL (ref 8.5–10.1)
CHLORIDE SERPL-SCNC: 100 MMOL/L (ref 97–108)
CO2 SERPL-SCNC: 26 MMOL/L (ref 21–32)
CREAT SERPL-MCNC: 0.87 MG/DL (ref 0.7–1.3)
ERYTHROCYTE [DISTWIDTH] IN BLOOD BY AUTOMATED COUNT: 16.9 % (ref 11.5–14.5)
GLOBULIN SER CALC-MCNC: 4.5 G/DL (ref 2–4)
GLUCOSE SERPL-MCNC: 98 MG/DL (ref 65–100)
HAV AB SER QL IA: POSITIVE
HBV CORE AB SERPL QL IA: NEGATIVE
HCT VFR BLD AUTO: 25.9 % (ref 36.6–50.3)
HGB BLD-MCNC: 8.8 G/DL (ref 12.1–17)
LIPASE SERPL-CCNC: 331 U/L (ref 73–393)
MCH RBC QN AUTO: 34.9 PG (ref 26–34)
MCHC RBC AUTO-ENTMCNC: 34 G/DL (ref 30–36.5)
MCV RBC AUTO: 102.8 FL (ref 80–99)
NRBC # BLD: 0 K/UL (ref 0–0.01)
NRBC BLD-RTO: 0 PER 100 WBC
PLATELET # BLD AUTO: 82 K/UL (ref 150–400)
PMV BLD AUTO: 11 FL (ref 8.9–12.9)
POTASSIUM SERPL-SCNC: 3.7 MMOL/L (ref 3.5–5.1)
PROT SERPL-MCNC: 7.2 G/DL (ref 6.4–8.2)
RBC # BLD AUTO: 2.52 M/UL (ref 4.1–5.7)
SODIUM SERPL-SCNC: 134 MMOL/L (ref 136–145)
WBC # BLD AUTO: 5.8 K/UL (ref 4.1–11.1)

## 2020-04-16 PROCEDURE — 74011250637 HC RX REV CODE- 250/637: Performed by: INTERNAL MEDICINE

## 2020-04-16 PROCEDURE — 76060000033 HC ANESTHESIA 1 TO 1.5 HR: Performed by: INTERNAL MEDICINE

## 2020-04-16 PROCEDURE — 74011000250 HC RX REV CODE- 250: Performed by: HOSPITALIST

## 2020-04-16 PROCEDURE — 65270000032 HC RM SEMIPRIVATE

## 2020-04-16 PROCEDURE — 74011250637 HC RX REV CODE- 250/637: Performed by: NURSE PRACTITIONER

## 2020-04-16 PROCEDURE — P9047 ALBUMIN (HUMAN), 25%, 50ML: HCPCS | Performed by: INTERNAL MEDICINE

## 2020-04-16 PROCEDURE — 74011250636 HC RX REV CODE- 250/636: Performed by: INTERNAL MEDICINE

## 2020-04-16 PROCEDURE — 74011000258 HC RX REV CODE- 258: Performed by: INTERNAL MEDICINE

## 2020-04-16 PROCEDURE — 74011250636 HC RX REV CODE- 250/636: Performed by: NURSE ANESTHETIST, CERTIFIED REGISTERED

## 2020-04-16 PROCEDURE — 83690 ASSAY OF LIPASE: CPT

## 2020-04-16 PROCEDURE — C9113 INJ PANTOPRAZOLE SODIUM, VIA: HCPCS | Performed by: HOSPITALIST

## 2020-04-16 PROCEDURE — 74011250637 HC RX REV CODE- 250/637: Performed by: HOSPITALIST

## 2020-04-16 PROCEDURE — 74011000250 HC RX REV CODE- 250: Performed by: NURSE ANESTHETIST, CERTIFIED REGISTERED

## 2020-04-16 PROCEDURE — 74011250636 HC RX REV CODE- 250/636: Performed by: HOSPITALIST

## 2020-04-16 PROCEDURE — 0DJ08ZZ INSPECTION OF UPPER INTESTINAL TRACT, VIA NATURAL OR ARTIFICIAL OPENING ENDOSCOPIC: ICD-10-PCS | Performed by: INTERNAL MEDICINE

## 2020-04-16 PROCEDURE — 80053 COMPREHEN METABOLIC PANEL: CPT

## 2020-04-16 PROCEDURE — 85027 COMPLETE CBC AUTOMATED: CPT

## 2020-04-16 PROCEDURE — 76040000008: Performed by: INTERNAL MEDICINE

## 2020-04-16 PROCEDURE — 36415 COLL VENOUS BLD VENIPUNCTURE: CPT

## 2020-04-16 RX ORDER — SODIUM CHLORIDE 9 MG/ML
INJECTION, SOLUTION INTRAVENOUS
Status: DISCONTINUED | OUTPATIENT
Start: 2020-04-16 | End: 2020-04-16 | Stop reason: HOSPADM

## 2020-04-16 RX ORDER — FENTANYL CITRATE 50 UG/ML
50-200 INJECTION, SOLUTION INTRAMUSCULAR; INTRAVENOUS
Status: DISCONTINUED | OUTPATIENT
Start: 2020-04-16 | End: 2020-04-16 | Stop reason: HOSPADM

## 2020-04-16 RX ORDER — SODIUM CHLORIDE 0.9 % (FLUSH) 0.9 %
5-40 SYRINGE (ML) INJECTION EVERY 8 HOURS
Status: DISCONTINUED | OUTPATIENT
Start: 2020-04-16 | End: 2020-04-18 | Stop reason: HOSPADM

## 2020-04-16 RX ORDER — SPIRONOLACTONE 25 MG/1
50 TABLET ORAL DAILY
Status: DISCONTINUED | OUTPATIENT
Start: 2020-04-16 | End: 2020-04-18 | Stop reason: HOSPADM

## 2020-04-16 RX ORDER — MIDAZOLAM HYDROCHLORIDE 1 MG/ML
5-10 INJECTION, SOLUTION INTRAMUSCULAR; INTRAVENOUS
Status: DISCONTINUED | OUTPATIENT
Start: 2020-04-16 | End: 2020-04-16 | Stop reason: HOSPADM

## 2020-04-16 RX ORDER — ATROPINE SULFATE 0.1 MG/ML
0.5 INJECTION INTRAVENOUS
Status: DISCONTINUED | OUTPATIENT
Start: 2020-04-16 | End: 2020-04-16 | Stop reason: HOSPADM

## 2020-04-16 RX ORDER — NALOXONE HYDROCHLORIDE 0.4 MG/ML
0.4 INJECTION, SOLUTION INTRAMUSCULAR; INTRAVENOUS; SUBCUTANEOUS
Status: DISCONTINUED | OUTPATIENT
Start: 2020-04-16 | End: 2020-04-16 | Stop reason: HOSPADM

## 2020-04-16 RX ORDER — LIDOCAINE HYDROCHLORIDE 20 MG/ML
INJECTION, SOLUTION EPIDURAL; INFILTRATION; INTRACAUDAL; PERINEURAL AS NEEDED
Status: DISCONTINUED | OUTPATIENT
Start: 2020-04-16 | End: 2020-04-16 | Stop reason: HOSPADM

## 2020-04-16 RX ORDER — DEXTROMETHORPHAN/PSEUDOEPHED 2.5-7.5/.8
1.2 DROPS ORAL
Status: DISCONTINUED | OUTPATIENT
Start: 2020-04-16 | End: 2020-04-16 | Stop reason: HOSPADM

## 2020-04-16 RX ORDER — PROPOFOL 10 MG/ML
INJECTION, EMULSION INTRAVENOUS AS NEEDED
Status: DISCONTINUED | OUTPATIENT
Start: 2020-04-16 | End: 2020-04-16 | Stop reason: HOSPADM

## 2020-04-16 RX ORDER — EPINEPHRINE 0.1 MG/ML
1 INJECTION INTRACARDIAC; INTRAVENOUS
Status: DISCONTINUED | OUTPATIENT
Start: 2020-04-16 | End: 2020-04-16 | Stop reason: HOSPADM

## 2020-04-16 RX ORDER — FLUMAZENIL 0.1 MG/ML
0.2 INJECTION INTRAVENOUS
Status: DISCONTINUED | OUTPATIENT
Start: 2020-04-16 | End: 2020-04-16 | Stop reason: HOSPADM

## 2020-04-16 RX ORDER — SODIUM CHLORIDE 0.9 % (FLUSH) 0.9 %
5-40 SYRINGE (ML) INJECTION AS NEEDED
Status: DISCONTINUED | OUTPATIENT
Start: 2020-04-16 | End: 2020-04-18 | Stop reason: HOSPADM

## 2020-04-16 RX ORDER — SODIUM CHLORIDE 9 MG/ML
50 INJECTION, SOLUTION INTRAVENOUS CONTINUOUS
Status: DISPENSED | OUTPATIENT
Start: 2020-04-16 | End: 2020-04-16

## 2020-04-16 RX ORDER — FUROSEMIDE 20 MG/1
20 TABLET ORAL DAILY
Status: DISCONTINUED | OUTPATIENT
Start: 2020-04-16 | End: 2020-04-18 | Stop reason: HOSPADM

## 2020-04-16 RX ADMIN — PHYTONADIONE 10 MG: 10 INJECTION, EMULSION INTRAMUSCULAR; INTRAVENOUS; SUBCUTANEOUS at 10:07

## 2020-04-16 RX ADMIN — LORAZEPAM 2 MG: 2 INJECTION INTRAMUSCULAR; INTRAVENOUS at 06:33

## 2020-04-16 RX ADMIN — FERROUS SULFATE TAB 325 MG (65 MG ELEMENTAL FE) 325 MG: 325 (65 FE) TAB at 06:33

## 2020-04-16 RX ADMIN — LIDOCAINE HYDROCHLORIDE 100 MG: 20 INJECTION, SOLUTION EPIDURAL; INFILTRATION; INTRACAUDAL; PERINEURAL at 16:29

## 2020-04-16 RX ADMIN — METHYLPREDNISOLONE SODIUM SUCCINATE 20 MG: 40 INJECTION, POWDER, FOR SOLUTION INTRAMUSCULAR; INTRAVENOUS at 08:52

## 2020-04-16 RX ADMIN — Medication 10 ML: at 21:28

## 2020-04-16 RX ADMIN — LORAZEPAM 2 MG: 2 INJECTION INTRAMUSCULAR; INTRAVENOUS at 12:26

## 2020-04-16 RX ADMIN — METHYLPREDNISOLONE SODIUM SUCCINATE 20 MG: 40 INJECTION, POWDER, FOR SOLUTION INTRAMUSCULAR; INTRAVENOUS at 18:49

## 2020-04-16 RX ADMIN — FOLIC ACID 1 MG: 1 TABLET ORAL at 08:51

## 2020-04-16 RX ADMIN — SODIUM CHLORIDE 40 MG: 9 INJECTION INTRAMUSCULAR; INTRAVENOUS; SUBCUTANEOUS at 06:33

## 2020-04-16 RX ADMIN — SODIUM CHLORIDE: 900 INJECTION, SOLUTION INTRAVENOUS at 16:22

## 2020-04-16 RX ADMIN — Medication 10 ML: at 06:34

## 2020-04-16 RX ADMIN — LORAZEPAM 2 MG: 2 INJECTION INTRAMUSCULAR; INTRAVENOUS at 02:09

## 2020-04-16 RX ADMIN — SPIRONOLACTONE 50 MG: 25 TABLET ORAL at 08:50

## 2020-04-16 RX ADMIN — PROPOFOL 25 MG: 10 INJECTION, EMULSION INTRAVENOUS at 16:33

## 2020-04-16 RX ADMIN — PROPOFOL 75 MG: 10 INJECTION, EMULSION INTRAVENOUS at 16:29

## 2020-04-16 RX ADMIN — SODIUM CHLORIDE 40 MG: 9 INJECTION INTRAMUSCULAR; INTRAVENOUS; SUBCUTANEOUS at 18:49

## 2020-04-16 RX ADMIN — ALBUMIN (HUMAN) 25 G: 0.25 INJECTION, SOLUTION INTRAVENOUS at 18:49

## 2020-04-16 RX ADMIN — Medication 100 MG: at 08:50

## 2020-04-16 RX ADMIN — ALBUMIN (HUMAN) 25 G: 0.25 INJECTION, SOLUTION INTRAVENOUS at 06:31

## 2020-04-16 RX ADMIN — FUROSEMIDE 20 MG: 20 TABLET ORAL at 08:51

## 2020-04-16 RX ADMIN — Medication 10 ML: at 14:00

## 2020-04-16 RX ADMIN — ALBUMIN (HUMAN) 25 G: 0.25 INJECTION, SOLUTION INTRAVENOUS at 12:26

## 2020-04-16 RX ADMIN — FERROUS SULFATE TAB 325 MG (65 MG ELEMENTAL FE) 325 MG: 325 (65 FE) TAB at 18:49

## 2020-04-16 RX ADMIN — LORAZEPAM 2 MG: 2 INJECTION INTRAMUSCULAR; INTRAVENOUS at 13:56

## 2020-04-16 NOTE — PERIOP NOTES
TRANSFER - OUT REPORT:    Verbal report given to Tonia Elena on Clydie Epley  being transferred to [facility] for routine progression of care       Report consisted of patients Situation, Background, Assessment and   Recommendations(SBAR). Information from the following report(s) SBAR, Procedure Summary, Recent Results and Cardiac Rhythm SR was reviewed with the receiving nurse. Lines:   Peripheral IV 04/14/20 Left Antecubital (Active)   Site Assessment Clean, dry, & intact 4/16/2020  8:15 AM   Phlebitis Assessment 0 4/16/2020  8:15 AM   Infiltration Assessment 0 4/16/2020  8:15 AM   Dressing Status Clean, dry, & intact 4/16/2020  8:15 AM   Dressing Type Transparent 4/16/2020  8:15 AM   Hub Color/Line Status Pink 4/16/2020  8:15 AM   Action Taken Open ports on tubing capped 4/16/2020  8:15 AM   Alcohol Cap Used Yes 4/16/2020  8:15 AM        Opportunity for questions and clarification was provided.       Patient transported with:   Hilario Goldsmith IV in place

## 2020-04-16 NOTE — ANESTHESIA POSTPROCEDURE EVALUATION
Procedure(s):  ESOPHAGOGASTRODUODENOSCOPY (EGD). MAC    Anesthesia Post Evaluation      Multimodal analgesia: multimodal analgesia not used between 6 hours prior to anesthesia start to PACU discharge  Patient location during evaluation: PACU  Patient participation: complete - patient participated  Level of consciousness: awake  Pain score: 0  Pain management: adequate  Airway patency: patent  Anesthetic complications: no  Cardiovascular status: acceptable  Respiratory status: acceptable  Hydration status: acceptable  Comments: I have evaluated the patient and meets criteria for discharge from PACU. Lisa Granados MD        Vitals Value Taken Time   /46 4/16/2020  4:49 PM   Temp 36.7 °C (98.1 °F) 4/16/2020  4:42 PM   Pulse 76 4/16/2020  4:49 PM   Resp 18 4/16/2020  4:49 PM   SpO2 92 % 4/16/2020  4:49 PM   Vitals shown include unvalidated device data.

## 2020-04-16 NOTE — CONSULTS
2 [facility]       Carl Ovalles MD, Laurie Gee, [address] , YUN Flores, [facility]     April S Declan [facility]   James HUGO Reeves [facility]        Grisel Deputa [facility] [address]    at [address], [facility] Meño White  22.    [phone]    FAX: [address] [place] [address], [address]    [phone]    FAX: [phone]            HEPATOLOGY PROGRESS NOTE  The patient is a 38year old  male with a long history of consuming alcohol in excess. For the past 6 months this has been 1 gallon of whisky every 3 days. He found out he had cirrhosis in 1/2020 when he underwent a CT scan after hemroidectomy. He tried to reduce alcohol consumption after finding out he had cirrhosis but develope withdrawal shakes and has been unable to.       He developed severe abdominal RUQ pain and came to the ED. He was found to have severe alcoholic hepatitis with DF of 60. Since admission the HB has drifted down 10 to 8.8 gms.       ASSESSMENT AND PLAN:  Alcohol hepatitis  There is elevation in AST>>ALT consistent with ETOH. There is a history of consuming alcohol in excess. The DF is 60. The alcoholic hepatitis is severe and associated with a 30% mortality in the next 6 months. Will start IV solumedrol and then convert to PO prednisone for discharge 40 mg every day for 28 days. The patient was advised to stop all alcohol consumption and that if there is ever a return to consuming alcohol there will be more severe lvier injury with less alcohol intake. Cirrhosis  Cirrhosis is secondary to alcohol.      The diagnosis of cirrhosis is based upon imaging, laboratory studies, complications of cirrhosis. Serologies for HBV, HCV are negative.   The patient has depressed but stable liver function. The CTP is 10. Child class C. The MELD cannot be calculated correctly because there is no INR. Assuming the INR is normal the MELD is 26.       Ascites   Ascites appears to have resolved. Continue IV albumin to support Sna and reduce diuretics to step 1/2. The patient was counseled regarding the need to maintain sodium restriction and the types of foods containing high amounts of sodium to be avoided.     Hyponatremia  This is secondary cirrhosis and alcohol abuse. Sna is stable  Will continue IV albumin 25 gm Q6H to support NA when we start diuretics.     Screening for Esophageal varices   The patient has not had an EGD to screen for varices. Will perform EGD later today to assess for varices and need for banding before discharge.       Hepatic encephalopathy   Overt HE has not developed to date. There is no need for treatment with lactulose and/or Xifaxan at this time. There is no need to restrict dietary protein at this time.       Anemia   This is due to multifactorial causes including portal hypertension with chronic GI blood loss bone marrow suppression secondary to malnutrition and alcohol. Ferritin is normal.  FE saturation is high. Will schedule for EGD to assess for UGI blood loss.       Thrombocytopenia   This is secondary to cirrhosis. There is no evidence of overt bleeding. No treatment is required. The platelet count is adequate for the patient to undergo procedures without the need for platelet transfusion or platelet growth factors.     Counseling for alcohol in patients with chronic liver disease  The patient has cirrhosis and was advised to be abstinent from all alcohol including non-alcoholic beer which does contain some alcohol.   It was recommended that all alcohol consumption be stopped and the patient be abstinent from alcohol     Risk of elective surgery in a patient with cirrhosis  The patient has cirrhosis Child class C and MELD of greater than 15. The risk of post-operative complications including hepatic decompensation is about 60-80%. Operative mortality risk is 10-30%.       PHYSICAL EXAMINATION:  VS: per nursing note  General:  No acute distress. Eyes:  Sclera icteric  ENT:  No oral lesions. Thyroid normal.  Nodes:  No adenopathy. Skin:  Spider angiomata. Jaundice. Respiratory:  Lungs clear to auscultation. Cardiovascular:  Regular heart rate. Abdomen:  Soft non-tender, Ascites appears to be present. Extremities:  No lower extremity edema. Neurologic:  Alert and oriented. Cranial nerves grossly intact. No asterixis.     LABORATORY:  Results for Willian Harris (MRN [id number]) as of 4/16/2020 06:36   Ref. Range 4/14/2020 07:46 4/15/2020 06:12 4/16/2020 02:01   WBC Latest Ref Range: 4.1 - 11.1 K/uL 8.5 7.2 5.8   HGB Latest Ref Range: 12.1 - 17.0 g/dL 10.9 (L) 10.0 (L) 8.8 (L)   PLATELET Latest Ref Range: 150 - 400 K/uL 102 (L) 84 (L) 82 (L)   INR Latest Ref Range: 0.9 - 1.1    2.3 (H)    Sodium Latest Ref Range: 136 - 145 mmol/L 133 (L) 133 (L) 134 (L)   Potassium Latest Ref Range: 3.5 - 5.1 mmol/L 3.2 (L) 3.9 3.7   Chloride Latest Ref Range: 97 - 108 mmol/L 103 103 100   CO2 Latest Ref Range: 21 - 32 mmol/L 25 24 26   Glucose Latest Ref Range: 65 - 100 mg/dL 99 82 98   BUN Latest Ref Range: 6 - 20 MG/DL 5 (L) 6 7   Creatinine Latest Ref Range: 0.70 - 1.30 MG/DL 0.81 0.76 0.87   Bilirubin, total Latest Ref Range: 0.2 - 1.0 MG/DL 8.9 (H) 12.4 (H) 12.8 (H)   Albumin Latest Ref Range: 3.5 - 5.0 g/dL 1.6 (L) 1.7 (L) 2.7 (L)   ALT (SGPT) Latest Ref Range: 12 - 78 U/L 37 29 22   AST Latest Ref Range: 15 - 37 U/L 126 (H) 99 (H) 71 (H)   Alk.  phosphatase Latest Ref Range: 45 - 117 U/L 244 (H) 163 (H) 135 (H)       MD Audie DarnellCreek [facility] 13  [address] [place] [address] Fatou

## 2020-04-16 NOTE — PROGRESS NOTES
EBONI: The plan is for patient to return home when stable, his girlfriend will transport at discharge. CM to follow if any needs arise.        Chyna Gold, [facility]

## 2020-04-16 NOTE — PERIOP NOTES
TRANSFER - IN REPORT:    Verbal report received from [place], [address] [place] [address] on Lorriane Cocks  being received from 208 for ordered procedure      Report consisted of patients Situation, Background, Assessment and   Recommendations(SBAR). Information from the following report(s) SBAR, Procedure Summary, Cardiac Rhythm SR, Pre Procedure Checklist and Procedure Verification was reviewed with the receiving nurse. Opportunity for questions and clarification was provided. Assessment completed upon patients arrival to unit and care assumed.

## 2020-04-16 NOTE — PROGRESS NOTES
Admission Medication Reconciliation:    Information obtained from:  Patient via telephone    Comments/Recommendations: Patient reports no current medications or medication allergies. ¹Rx[employer] pharmacy benefit data reflects medications filled and processed through the patient's insurance, however   this data does NOT capture whether the medication was picked up or is currently being taken by the patient. Allergies:  Patient has no known allergies.     Significant PMH/Disease States:   Past Medical History:   Diagnosis Date    Pancreatitis      Chief Complaint for this Admission:    Chief Complaint   Patient presents with    Abdominal Pain     Prior to Admission Medications:   None

## 2020-04-16 NOTE — ANESTHESIA PREPROCEDURE EVALUATION
Relevant Problems   No relevant active problems       Anesthetic History   No history of anesthetic complications            Review of Systems / Medical History  Patient summary reviewed, nursing notes reviewed and pertinent labs reviewed    Pulmonary  Within defined limits                 Neuro/Psych         Psychiatric history     Cardiovascular                       GI/Hepatic/Renal       Hepatitis    Liver disease    Comments: Alcoholic cirrohosis  jaundice Endo/Other        Anemia     Other Findings   Comments: Ativan at 2pm today         Physical Exam    Airway  Mallampati: II  TM Distance: > 6 cm  Neck ROM: normal range of motion   Mouth opening: Normal     Cardiovascular  Regular rate and rhythm,  S1 and S2 normal,  no murmur, click, rub, or gallop             Dental  No notable dental hx       Pulmonary  Breath sounds clear to auscultation               Abdominal  GI exam deferred       Other Findings            Anesthetic Plan    ASA: 3  Anesthesia type: MAC            Anesthetic plan and risks discussed with: Patient

## 2020-04-16 NOTE — PROGRESS NOTES
Bedside shift change report given to Juan Davis RN (oncoming nurse) by JUAN LUIS Perez (offgoing nurse). Report included the following information SBAR, Kardex, MAR and Recent Results.

## 2020-04-16 NOTE — PROCEDURES
[address] [facility], MD, [address], [place]ite Estela River MD Dorice Saxon, YUN Marks, [facility]     Mehreen FELIX Declan, [facility]   TONIE CareyP-JOSE Calabrese Rosalee, [facility]       Grisel Santoro [facility] [address]    at [address], [facility] Meño White  22.    [phone]    FAX: [address] [place] [address], [address]    [phone]    FAX: [phone]         UPPER ENDOSCOPY PROCEDURE NOTE    Julien Pinto  1975    INDICATION: Cirrhosis. Screening for esophageal varices with variceal ligation if  indicated. : Horace Harley MD    SURGICAL ASSISTANT:  None    PROSTHETIC DEVISES, TISSUE GRAFTS, ORGAN TRANSPLANTS:  Not applicable     ANESTHESIA/SEDATION: Sedation per anesthesiology    PROCEDURE DESCRIPTION:  [employer] consent was obtained from the patient for the procedure. All risks and benefits of the procedure explained. The patient was taken to the endoscopy suite and placed in the left lateral decubitus position. Following sequential administration of sedation to doses as indicated above the endoscope was inserted into the mouth and advanced under direct vision to the second portion of the duodenum. Careful inspection of upper gastrointestinal tract was made as the endoscope was inserted and withdrawn. Retroflexion of the endoscope to view of the cardia of the stomach was performed. The findings and interventions are described below. FINDINGS:  Esophagus:    Normal.      Stomach: Moderate - Severe portal hypertensive gastropathy of the entire stomach  Gastric varices identified. Duodenum:   Normal bulb and second portion    INTERVENTION:   None    COMPLICATIONS: None.   The patient tolerated the procedure well. EBL: Negligible. RECOMMENDATIONS:  Observe until discharge parameters are achieved. May resume previous diet.       Awa Vivar MD  [facility] of [address], [address] [place] [address].  [phone]  [address] [place] [address]

## 2020-04-16 NOTE — PROGRESS NOTES
Bedside and Verbal shift change report given to Bala Santana RN (oncoming nurse) by Jesika Moreno RN (offgoing nurse). Report included the following information SBAR, Kardex, Intake/Output and MAR.

## 2020-04-16 NOTE — PERIOP NOTES

## 2020-04-16 NOTE — PROGRESS NOTES
[address] [facility] Adult  Hospitalist Group                                                                                          Hospitalist Progress Note  Alyce Brunner, MD  Answering service: [phone] from in house phone        Date of Service:  2020  NAME:  Lily Marcos  :  1975  MRN:  [id number]      Admission Summary: This is a 26-year-old male patient with past medical history significant for alcoholic liver cirrhosis, alcohol and tobacco abuse, presented to [facility] Emergency Department with epigastric pain. Interval history / Subjective:     He denied any abdominal pain,nasuea/vomiting,SOB today  Plan for EGD today. Assessment & Plan:     Epigastric pain suspected due to biliary dyskinesia -resolved  -HIDA there is no significant emptying of the gallbladder after CCK administration  -lipase 548  -no evidence of acute cholecystitis  -continue IVF, Protonix, prn zofran  -Surgical Service on board said laparoscopic cholecystectomy is not an option for him with his severe liver failure,Sometime in the future could consider cholecystectomy if he is continuing to have some pain issues  -OP follow up. Jaundice secondary to alcoholic hepatitis , liver cirrhosis  -bilirubin trending up   -US gallbladder distention with mild wall thickening and focal tenderness but no calculi, There are no intraluminal calculi. . The intra and extrahepatic biliary ductal systems are normal and there is no focal hepatic parenchymal abnormality  -GI and Hepatologist on board  -started on steroids for alcoholic hepatitis due to high DF score.     Alcoholic liver cirrhosis   -liver enzyme improving  -continue lasix, spironolactone, albumin     Anemia of chronic disease   -H/H stable  -on ferrous sulfate  -iron and ferritin level are normal, iron saturation low     Hyponatremia -improved      Hypokalemia - solved    Alcohol abuse   -advised to stop smoking,   -on thiamine and folate  -On [facility] protocol    Thrombocytopenia   -related to liver disease        Code status: Full Code   DVT prophylaxis: SCD    Care Plan discussed with: Patient/Family and Nurse  Anticipated Disposition: Home w/Family  Anticipated Discharge: 24 hours to 48 hours     Hospital Problems  Date Reviewed: 4/14/2020          Codes Class Noted POA    Cholecystitis ICD-10-CM: K81.9  ICD-9-CM: 575.10  4/14/2020 Unknown        * (Principal) Epigastric pain ICD-10-CM: R10.13  ICD-9-CM: 789.06  4/14/2020 Unknown                Vital Signs:    Last 24hrs VS reviewed since prior progress note. Most recent are:  Visit Vitals  /63 (BP 1 Location: Right arm)   Pulse 85   Temp 98.4 °F (36.9 °C)   Resp 14   Ht 5' 9\" (1.753 m)   Wt 77.1 kg (170 lb)   SpO2 93%   BMI 25.10 kg/m²       No intake or output data in the 24 hours ending 04/16/20 0830     Physical Examination:             Constitutional:  No acute distress, cooperative, pleasant    ENT:  Icteric sclera, oral mucosa moist, oropharynx benign. Resp:  CTA bilaterally. No wheezing. No accessory muscle use   CV:  Regular rhythm, normal rate, no murmurs, gallops, rubs    GI:  Soft, non distended, non tender. normoactive bowel sounds    Musculoskeletal:  No edema     Neurologic:  Moves all extremities.   AAOx3, CN II-XII reviewed     Skin:  Jaundice       Data Review:    Review and/or order of clinical lab test  Review and/or order of tests in the radiology section of CPT  Review and/or order of tests in the medicine section of CPT      Labs:     Recent Labs     04/16/20  0201 04/15/20  0612   WBC 5.8 7.2   HGB 8.8* 10.0*   HCT 25.9* 28.9*   PLT 82* 84*     Recent Labs     04/16/20  0201 04/15/20  0612 04/14/20  0746   * 133* 133*   K 3.7 3.9 3.2*    103 103   CO2 26 24 25   BUN 7 6 5*   CREA 0.87 0.76 0.81   GLU 98 82 99   CA 8.4* 7.9* 8.1*   MG  --   --  1.9     Recent Labs     04/16/20  0201 04/15/20  0612 04/14/20  0746   SGOT 71* 99* 126*   ALT 22 29 37   AP 135* 163* 244*   TBILI 12.8* 12.4* 8.9*   TP 7.2 6.8 8.2   ALB 2.7* 1.7* 1.6*   GLOB 4.5* 5.1* 6.6*   LPSE 331 312 548*     Recent Labs     04/15/20  0612   INR 2.3*   PTP 22.0*      Recent Labs     04/15/20  0612   TIBC 131*   PSAT 82*   FERR 253      No results found for: FOL, RBCF   No results for input(s): PH, PCO2, PO2 in the last 72 hours. No results for input(s): CPK, CKNDX, TROIQ in the last 72 hours.     No lab exists for component: CPKMB  No results found for: CHOL, CHOLX, CHLST, CHOLV, HDL, HDLP, LDL, LDLC, DLDLP, TGLX, TRIGL, TRIGP, CHHD, CHHDX  No results found for: GLUCPOC  No results found for: COLOR, APPRN, SPGRU, REFSG, RENATE, PROTU, GLUCU, KETU, BILU, UROU, FLORIAN, LEUKU, GLUKE, EPSU, BACTU, WBCU, RBCU, CASTS, UCRY      Medications Reviewed:     Current Facility-Administered Medications   Medication Dose Route Frequency    phytonadione (vitamin K1) (AQUA-MEPHYTON) 10 mg in 0.9% sodium chloride 50 mL IVPB  10 mg IntraVENous DAILY    methylPREDNISolone (PF) (SOLU-MEDROL) injection 20 mg  20 mg IntraVENous BID    furosemide (LASIX) tablet 20 mg  20 mg Oral DAILY    spironolactone (ALDACTONE) tablet 50 mg  50 mg Oral DAILY    folic acid (FOLVITE) tablet 1 mg  1 mg Oral DAILY    thiamine HCL (B-1) tablet 100 mg  100 mg Oral DAILY    ferrous sulfate tablet 325 mg  1 Tab Oral BID WITH MEALS    nicotine (NICODERM CQ) 21 mg/24 hr patch 1 Patch  1 Patch TransDERmal DAILY    sodium chloride (NS) flush 5-40 mL  5-40 mL IntraVENous Q8H    sodium chloride (NS) flush 5-40 mL  5-40 mL IntraVENous PRN    oxyCODONE-acetaminophen (PERCOCET) 5-325 mg per tablet 1 Tab  1 Tab Oral Q4H PRN    naloxone (NARCAN) injection 0.4 mg  0.4 mg IntraVENous PRN    ondansetron (ZOFRAN) injection 4 mg  4 mg IntraVENous Q4H PRN    pantoprazole (PROTONIX) 40 mg in 0.9% sodium chloride 10 mL injection  40 mg IntraVENous Q12H    LORazepam (ATIVAN) injection 2 mg  2 mg IntraVENous Q1H PRN    LORazepam (ATIVAN) injection 4 mg 4 mg IntraVENous Q1H PRN    albumin human 25% (BUMINATE) solution 25 g  25 g IntraVENous Q6H     ______________________________________________________________________  EXPECTED LENGTH OF STAY: 4d 9h  ACTUAL LENGTH OF STAY:          2                 Stephanie Hall MD

## 2020-04-17 LAB
ALBUMIN SERPL-MCNC: 3.6 G/DL (ref 3.5–5)
ALBUMIN/GLOB SERPL: 0.8 {RATIO} (ref 1.1–2.2)
ALP SERPL-CCNC: 136 U/L (ref 45–117)
ALT SERPL-CCNC: 26 U/L (ref 12–78)
ANION GAP SERPL CALC-SCNC: 8 MMOL/L (ref 5–15)
AST SERPL-CCNC: 56 U/L (ref 15–37)
BILIRUB DIRECT SERPL-MCNC: 6.2 MG/DL (ref 0–0.2)
BILIRUB SERPL-MCNC: 12.6 MG/DL (ref 0.2–1)
BUN SERPL-MCNC: 9 MG/DL (ref 6–20)
BUN/CREAT SERPL: 11 (ref 12–20)
CALCIUM SERPL-MCNC: 9.4 MG/DL (ref 8.5–10.1)
CHLORIDE SERPL-SCNC: 102 MMOL/L (ref 97–108)
CO2 SERPL-SCNC: 23 MMOL/L (ref 21–32)
CREAT SERPL-MCNC: 0.83 MG/DL (ref 0.7–1.3)
ERYTHROCYTE [DISTWIDTH] IN BLOOD BY AUTOMATED COUNT: 16.3 % (ref 11.5–14.5)
GLOBULIN SER CALC-MCNC: 4.8 G/DL (ref 2–4)
GLUCOSE SERPL-MCNC: 121 MG/DL (ref 65–100)
HCT VFR BLD AUTO: 29.9 % (ref 36.6–50.3)
HGB BLD-MCNC: 10.2 G/DL (ref 12.1–17)
INR PPP: 2.2 (ref 0.9–1.1)
MCH RBC QN AUTO: 35.1 PG (ref 26–34)
MCHC RBC AUTO-ENTMCNC: 34.1 G/DL (ref 30–36.5)
MCV RBC AUTO: 102.7 FL (ref 80–99)
NRBC # BLD: 0.02 K/UL (ref 0–0.01)
NRBC BLD-RTO: 0.2 PER 100 WBC
PLATELET # BLD AUTO: 97 K/UL (ref 150–400)
PMV BLD AUTO: 10.9 FL (ref 8.9–12.9)
POTASSIUM SERPL-SCNC: 4.4 MMOL/L (ref 3.5–5.1)
PROT SERPL-MCNC: 8.4 G/DL (ref 6.4–8.2)
PROTHROMBIN TIME: 21.6 SEC (ref 9–11.1)
RBC # BLD AUTO: 2.91 M/UL (ref 4.1–5.7)
SODIUM SERPL-SCNC: 133 MMOL/L (ref 136–145)
WBC # BLD AUTO: 10.2 K/UL (ref 4.1–11.1)

## 2020-04-17 PROCEDURE — 87086 URINE CULTURE/COLONY COUNT: CPT

## 2020-04-17 PROCEDURE — 74011250636 HC RX REV CODE- 250/636: Performed by: HOSPITALIST

## 2020-04-17 PROCEDURE — 74011000258 HC RX REV CODE- 258: Performed by: INTERNAL MEDICINE

## 2020-04-17 PROCEDURE — 74011250637 HC RX REV CODE- 250/637: Performed by: HOSPITALIST

## 2020-04-17 PROCEDURE — 80076 HEPATIC FUNCTION PANEL: CPT

## 2020-04-17 PROCEDURE — 74011250637 HC RX REV CODE- 250/637: Performed by: NURSE PRACTITIONER

## 2020-04-17 PROCEDURE — 80048 BASIC METABOLIC PNL TOTAL CA: CPT

## 2020-04-17 PROCEDURE — 74011000250 HC RX REV CODE- 250: Performed by: HOSPITALIST

## 2020-04-17 PROCEDURE — P9047 ALBUMIN (HUMAN), 25%, 50ML: HCPCS | Performed by: INTERNAL MEDICINE

## 2020-04-17 PROCEDURE — 87040 BLOOD CULTURE FOR BACTERIA: CPT

## 2020-04-17 PROCEDURE — 85027 COMPLETE CBC AUTOMATED: CPT

## 2020-04-17 PROCEDURE — 74011250637 HC RX REV CODE- 250/637: Performed by: INTERNAL MEDICINE

## 2020-04-17 PROCEDURE — C9113 INJ PANTOPRAZOLE SODIUM, VIA: HCPCS | Performed by: HOSPITALIST

## 2020-04-17 PROCEDURE — 65270000032 HC RM SEMIPRIVATE

## 2020-04-17 PROCEDURE — 85610 PROTHROMBIN TIME: CPT

## 2020-04-17 PROCEDURE — 36415 COLL VENOUS BLD VENIPUNCTURE: CPT

## 2020-04-17 PROCEDURE — 74011250636 HC RX REV CODE- 250/636: Performed by: INTERNAL MEDICINE

## 2020-04-17 RX ADMIN — METHYLPREDNISOLONE SODIUM SUCCINATE 20 MG: 40 INJECTION, POWDER, FOR SOLUTION INTRAMUSCULAR; INTRAVENOUS at 08:51

## 2020-04-17 RX ADMIN — Medication 100 MG: at 08:51

## 2020-04-17 RX ADMIN — ALBUMIN (HUMAN) 25 G: 0.25 INJECTION, SOLUTION INTRAVENOUS at 00:13

## 2020-04-17 RX ADMIN — METHYLPREDNISOLONE SODIUM SUCCINATE 20 MG: 40 INJECTION, POWDER, FOR SOLUTION INTRAMUSCULAR; INTRAVENOUS at 17:26

## 2020-04-17 RX ADMIN — LACTULOSE 30 ML: 20 SOLUTION ORAL at 08:52

## 2020-04-17 RX ADMIN — SPIRONOLACTONE 50 MG: 25 TABLET ORAL at 08:51

## 2020-04-17 RX ADMIN — Medication 10 ML: at 21:39

## 2020-04-17 RX ADMIN — FERROUS SULFATE TAB 325 MG (65 MG ELEMENTAL FE) 325 MG: 325 (65 FE) TAB at 07:00

## 2020-04-17 RX ADMIN — ALBUMIN (HUMAN) 25 G: 0.25 INJECTION, SOLUTION INTRAVENOUS at 17:25

## 2020-04-17 RX ADMIN — ALBUMIN (HUMAN) 25 G: 0.25 INJECTION, SOLUTION INTRAVENOUS at 23:31

## 2020-04-17 RX ADMIN — SODIUM CHLORIDE 40 MG: 9 INJECTION INTRAMUSCULAR; INTRAVENOUS; SUBCUTANEOUS at 07:00

## 2020-04-17 RX ADMIN — PHYTONADIONE 10 MG: 10 INJECTION, EMULSION INTRAMUSCULAR; INTRAVENOUS; SUBCUTANEOUS at 09:10

## 2020-04-17 RX ADMIN — Medication 10 ML: at 13:18

## 2020-04-17 RX ADMIN — FERROUS SULFATE TAB 325 MG (65 MG ELEMENTAL FE) 325 MG: 325 (65 FE) TAB at 17:25

## 2020-04-17 RX ADMIN — ALBUMIN (HUMAN) 25 G: 0.25 INJECTION, SOLUTION INTRAVENOUS at 05:53

## 2020-04-17 RX ADMIN — Medication 10 ML: at 13:17

## 2020-04-17 RX ADMIN — Medication 10 ML: at 05:56

## 2020-04-17 RX ADMIN — FOLIC ACID 1 MG: 1 TABLET ORAL at 08:51

## 2020-04-17 RX ADMIN — ALBUMIN (HUMAN) 25 G: 0.25 INJECTION, SOLUTION INTRAVENOUS at 11:46

## 2020-04-17 RX ADMIN — FUROSEMIDE 20 MG: 20 TABLET ORAL at 08:51

## 2020-04-17 RX ADMIN — LACTULOSE 30 ML: 20 SOLUTION ORAL at 17:25

## 2020-04-17 RX ADMIN — SODIUM CHLORIDE 40 MG: 9 INJECTION INTRAMUSCULAR; INTRAVENOUS; SUBCUTANEOUS at 19:31

## 2020-04-17 RX ADMIN — LORAZEPAM 2 MG: 2 INJECTION INTRAMUSCULAR; INTRAVENOUS at 07:19

## 2020-04-17 NOTE — PROGRESS NOTES
[address] [facility] Adult  Hospitalist Group                                                                                          Hospitalist Progress Note  Vicente Brooke MD  Answering service: [phone] from in house phone        Date of Service:  2020  NAME:  Cleo Haque  :  1975  MRN:  [id number]      Admission Summary: This is a 53-year-old male patient with past medical history significant for alcoholic liver cirrhosis, alcohol and tobacco abuse, presented to [facility] Emergency Department with epigastric pain. Interval history / Subjective:     He has tremors today,required ativan for alcohol withdrawal symptoms. He denied any SOB,abdominal pain. Assessment & Plan:     Jaundice secondary to alcoholic hepatitis , liver cirrhosis  -bilirubin trending up   -US gallbladder distention with mild wall thickening and focal tenderness but no calculi, There are no intraluminal calculi. . The intra and extrahepatic biliary ductal systems are normal and there is no focal hepatic parenchymal abnormality  -GI and Hepatologist on board  -started on steroids for alcoholic hepatitis due to high DF score -currently on IV methyl prednisone    Alcoholic liver cirrhosis   -continue lasix, spironolactone, albumin   -s/p EGD on  -Moderate - Severe portal hypertensive gastropathy of the entire stomach  Gastric varices identified  -INR : 2.2 , receiving vitamin K  -on protonix        Chronic alcoholism with alcohol withdrawal symptoms:  -on thiamine and folate  -On CIWA protocol-he has required 6 mg ativan in the last 24 hrs  -counseled about alcohol cessation    Anemia of chronic disease   -H/H stable  -on ferrous sulfate  -iron and ferritin level are normal, iron saturation low     Epigastric pain suspected due to biliary dyskinesia -resolved  -HIDA there is no significant emptying of the gallbladder after CCK administration  -lipase 548  -no evidence of acute cholecystitis  -continue IVF, Protonix, prn zofran  -Surgical Service on board said laparoscopic cholecystectomy is not an option for him with his severe liver failure,Sometime in the future could consider cholecystectomy if he is continuing to have some pain issues  -OP follow up. Hyponatremia -improved    Hypokalemia - resolved      Thrombocytopenia   -related to liver disease        Code status: Full Code   DVT prophylaxis: SCD    Care Plan discussed with: Patient/Family and Nurse  Anticipated Disposition: Home w/Family  Anticipated Discharge: 24 hours to 48 hours     Hospital Problems  Date Reviewed: 4/16/2020          Codes Class Noted POA    Cholecystitis ICD-10-CM: K81.9  ICD-9-CM: 575.10  4/14/2020 Unknown        * (Principal) Epigastric pain ICD-10-CM: R10.13  ICD-9-CM: 789.06  4/14/2020 Unknown                Vital Signs:    Last 24hrs VS reviewed since prior progress note. Most recent are:  Visit Vitals  /72 (BP 1 Location: Right arm, BP Patient Position: At rest)   Pulse 86   Temp 97.8 °F (36.6 °C)   Resp 16   Ht 5' 9\" (1.753 m)   Wt 75.1 kg (165 lb 9.1 oz)   SpO2 93%   BMI 24.45 kg/m²         Intake/Output Summary (Last 24 hours) at 4/17/2020 1255  Last data filed at 4/16/2020 1644  Gross per 24 hour   Intake 100 ml   Output 625 ml   Net -525 ml        Physical Examination:             Constitutional:  No acute distress, cooperative, pleasant    ENT:  Icteric sclera, oral mucosa moist, oropharynx benign. Resp:  CTA bilaterally. No wheezing. No accessory muscle use   CV:  Regular rhythm, normal rate, no murmurs, gallops, rubs    GI:  Soft, non distended, non tender. normoactive bowel sounds    Musculoskeletal:  No edema     Neurologic:  Moves all extremities.   AAOx2-3, has UE tremors     Skin:  Jaundice       Data Review:    Review and/or order of clinical lab test  Review and/or order of tests in the medicine section of CPT      Labs:     Recent Labs     04/17/20  0708 04/16/20  0201   WBC 10.2 5.8   HGB 10.2* 8.8*   HCT 29.9* 25.9*   PLT 97* 82*     Recent Labs     04/17/20  0708 04/16/20  0201 04/15/20  0612   * 134* 133*   K 4.4 3.7 3.9    100 103   CO2 23 26 24   BUN 9 7 6   CREA 0.83 0.87 0.76   * 98 82   CA 9.4 8.4* 7.9*     Recent Labs     04/17/20  0708 04/16/20  0201 04/15/20  0612   SGOT 56* 71* 99*   ALT 26 22 29   * 135* 163*   TBILI 12.6* 12.8* 12.4*   TP 8.4* 7.2 6.8   ALB 3.6 2.7* 1.7*   GLOB 4.8* 4.5* 5.1*   LPSE  --  331 312     Recent Labs     04/17/20  0708 04/15/20  0612   INR 2.2* 2.3*   PTP 21.6* 22.0*      Recent Labs     04/15/20  0612   TIBC 131*   PSAT 82*   FERR 253      No results found for: FOL, RBCF   No results for input(s): PH, PCO2, PO2 in the last 72 hours. No results for input(s): CPK, CKNDX, TROIQ in the last 72 hours.     No lab exists for component: CPKMB  No results found for: CHOL, CHOLX, CHLST, CHOLV, HDL, HDLP, LDL, LDLC, DLDLP, TGLX, TRIGL, TRIGP, CHHD, CHHDX  No results found for: GLUCPOC  No results found for: COLOR, APPRN, SPGRU, REFSG, RENATE, PROTU, GLUCU, KETU, BILU, UROU, FLORIAN, LEUKU, GLUKE, EPSU, BACTU, WBCU, RBCU, CASTS, UCRY      Medications Reviewed:     Current Facility-Administered Medications   Medication Dose Route Frequency    lactulose (CHRONULAC) 10 gram/15 mL solution 30 mL  30 mL Oral BID    phytonadione (vitamin K1) (AQUA-MEPHYTON) 10 mg in 0.9% sodium chloride 50 mL IVPB  10 mg IntraVENous DAILY    methylPREDNISolone (PF) (SOLU-MEDROL) injection 20 mg  20 mg IntraVENous BID    furosemide (LASIX) tablet 20 mg  20 mg Oral DAILY    spironolactone (ALDACTONE) tablet 50 mg  50 mg Oral DAILY    sodium chloride (NS) flush 5-40 mL  5-40 mL IntraVENous Q8H    sodium chloride (NS) flush 5-40 mL  5-40 mL IntraVENous PRN    folic acid (FOLVITE) tablet 1 mg  1 mg Oral DAILY    thiamine HCL (B-1) tablet 100 mg  100 mg Oral DAILY    ferrous sulfate tablet 325 mg  1 Tab Oral BID WITH MEALS    nicotine (NICODERM CQ) 21 mg/24 hr patch 1 Patch  1 Patch TransDERmal DAILY    sodium chloride (NS) flush 5-40 mL  5-40 mL IntraVENous Q8H    sodium chloride (NS) flush 5-40 mL  5-40 mL IntraVENous PRN    oxyCODONE-acetaminophen (PERCOCET) 5-325 mg per tablet 1 Tab  1 Tab Oral Q4H PRN    naloxone (NARCAN) injection 0.4 mg  0.4 mg IntraVENous PRN    ondansetron (ZOFRAN) injection 4 mg  4 mg IntraVENous Q4H PRN    pantoprazole (PROTONIX) 40 mg in 0.9% sodium chloride 10 mL injection  40 mg IntraVENous Q12H    LORazepam (ATIVAN) injection 2 mg  2 mg IntraVENous Q1H PRN    LORazepam (ATIVAN) injection 4 mg  4 mg IntraVENous Q1H PRN    albumin human 25% (BUMINATE) solution 25 g  25 g IntraVENous Q6H     ______________________________________________________________________  EXPECTED LENGTH OF STAY: 3d 4h  ACTUAL LENGTH OF STAY:          3                 Prasad Mark MD

## 2020-04-17 NOTE — PROGRESS NOTES
2 [facility] 323 [facility]       Aris Benitez MD, Romana Montoya, Neto Shaffer, MD Amber Flores, YUN Chavez, [facility]    Erika Wade, [facility]   Ananth Perez, [facility]  BRITNEYselvin Ngo, [facility]      [address] [facility]  [id number] [place] [address], [address] [address]  [place], [address] [facility]    [phone]    FAX: [phone] [facility] 13 [facility]  [address]  6001 [facility], [address]  [place], [address] [facility]    [phone]    FAX: [phone]            HEPATOLOGY PROGRESS NOTE  The patient is a 38year old  male with a long history of consuming alcohol in excess.  For the past 6 months this has been 1 gallon of whisky every 3 days. Tristan Mojicaa found out he had cirrhosis in 1/2020 when he underwent a CT scan after hemroidectomy. Tristan Jackson tried to reduce alcohol consumption after finding out he had cirrhosis but develope withdrawal shakes and has been unable to. He developed severe abdominal RUQ pain and came to the ED.      He has severe alcoholic hepatitis with DF of 60. He was started on steroids. Has been getting Vitamin K     Less tremor today. Mental status clear. He can go home today. Please Dc on prednisone 20 mg BID. No diuretics.   I will see him for virtual visit next week.     ASSESSMENT AND PLAN:  Alcohol hepatitis  There is elevation in AST>>ALT consistent with ETOH.     There is a history of consuming alcohol in excess. The DF is 60. The alcoholic hepatitis is severe and associated with a 30% mortality in the next 3 months. Please discharge on prednisone 20 mg BID for another 26 days.     Cirrhosis  Cirrhosis is secondary to alcohol.     The diagnosis of cirrhosis is based upon imaging, laboratory studies, complications of cirrhosis.   Serologies for HBV, HCV are negative.   The CTP is 10.  Child class MELD 26. Ascites   Ascites appears to have resolved with diuretics. .    We can stop diuretics. No diuretics for discharge.     Hyponatremia  This is secondary cirrhosis and alcohol abuse. Sna is stable     Screening for Esophageal varices   EGD yesterday showed gastric varices and severe portal gastropathy but nol active bleeding. Continue IV protonix.     Hepatic encephalopathy   He is tremulous this AM with impending withdrawal and getting confused. He has mild asterixis. Will need to start lactulose 30 cc BID  There is no need to restrict dietary protein at this time.       Anemia   This is due to multifactorial causes including portal hypertension with severe portal gastropathy and gastric varices with chronic GI blood and loss bone marrow suppression secondary to malnutrition and alcohol. Ferritin is normal.  FE saturation is high. There is no need for PO FE. Thrombocytopenia   This is secondary to cirrhosis. There is no evidence of overt bleeding.    No treatment is required. The platelet count is adequate for the patient to undergo procedures without the need for platelet transfusion or platelet growth factors.     Counseling for alcohol in patients with chronic liver disease  The patient has cirrhosis and was advised to be abstinent from all alcohol including non-alcoholic beer which does contain some alcohol. It was recommended that all alcohol consumption be stopped and the patient be abstinent from alcohol        PHYSICAL EXAMINATION:  VS: per nursing note  General:  No acute distress. Eyes:  Sclera icteric  ENT:  No oral lesions.  Thyroid normal.  Nodes:  No adenopathy. Skin:  Spider angiomata.  Jaundice. Respiratory:  Lungs clear to auscultation. Cardiovascular:  Regular heart rate. Abdomen:  Soft non-tender, Ascites appears to be present.   Extremities:  No lower extremity edema.    Neurologic:  Tremulous, mild asterixis, mild HE with some confusion and slowness in thinking.       LABORATORY:  Results for Alisha Reynaga (MRN [id number]) as of 4/17/2020 07:00   Ref. Range 4/14/2020 07:46 4/15/2020 06:12 4/16/2020 02:01   WBC Latest Ref Range: 4.1 - 11.1 K/uL 8.5 7.2 5.8   HGB Latest Ref Range: 12.1 - 17.0 g/dL 10.9 (L) 10.0 (L) 8.8 (L)   PLATELET Latest Ref Range: 150 - 400 K/uL 102 (L) 84 (L) 82 (L)   INR Latest Ref Range: 0.9 - 1.1    2.3 (H)    Sodium Latest Ref Range: 136 - 145 mmol/L 133 (L) 133 (L) 134 (L)   Potassium Latest Ref Range: 3.5 - 5.1 mmol/L 3.2 (L) 3.9 3.7   Chloride Latest Ref Range: 97 - 108 mmol/L 103 103 100   CO2 Latest Ref Range: 21 - 32 mmol/L 25 24 26   Glucose Latest Ref Range: 65 - 100 mg/dL 99 82 98   BUN Latest Ref Range: 6 - 20 MG/DL 5 (L) 6 7   Creatinine Latest Ref Range: 0.70 - 1.30 MG/DL 0.81 0.76 0.87   Bilirubin, total Latest Ref Range: 0.2 - 1.0 MG/DL 8.9 (H) 12.4 (H) 12.8 (H)   Albumin Latest Ref Range: 3.5 - 5.0 g/dL 1.6 (L) 1.7 (L) 2.7 (L)   ALT (SGPT) Latest Ref Range: 12 - 78 U/L 37 29 22   AST Latest Ref Range: 15 - 37 U/L 126 (H) 99 (H) 71 (H)   Alk.  phosphatase Latest Ref Range: 45 - 117 U/L 244 (H) 163 (H) 135 (H)       MD Audie [facility] 13 [address], [address] [place] [address] 22  201 [facility]

## 2020-04-17 NOTE — PROGRESS NOTES
NUTRITION brief  Recommendations:   1. Encourage good protein intake with all meals  2. Add daily MVI if not drinking Ensure shakes       Diet: GI lite  Meal intake:   Patient Vitals for the past 168 hrs:   % Diet Eaten   04/14/20 1827 100 %     Chart reviewed for diet check. S/p EGD yesterday showing gastric varices and severe portal gastropathy but no active bleeding. Confusion today with likely ETOH withdrawal per hepatology notes. Lactulose rx. Diet advanced after EGD with good appetite yesterday. Will add 2gm Na to diet order. Since possible pancreatitis on admit will add Ensure High Protein BID for added protein intake but lower fat content (320kcal, 32g protein each). Plan to follow with goals and monitoring/evaluation per previous RD note. Estimated Nutrition Needs:   Kcals/day: 1247 Kcals/day(1772-1920kcal)  Protein: 93 g(93-108g (1.2-1.4g/kg))  Fluid: 1800 ml(1ml/kcal)  Based On:  [address] Prerna(x 1.2-1.3)  Weight Used: Actual wt(77.6kg)    Nisreen Rapp RD [facility], Pager #[phone] or via [place]

## 2020-04-17 NOTE — PROGRESS NOTES
Problem: Pain  Goal: *Control of Pain  Outcome: Progressing Towards Goal     Problem: Falls - Risk of  Goal: *Absence of Falls  Description: Document Jennifer Fall Risk and appropriate interventions in the flowsheet.   Outcome: Progressing Towards Goal  Note: Fall Risk Interventions:  Mobility Interventions: Bed/chair exit alarm, Patient to call before getting OOB, Strengthening exercises (ROM-active/passive)         Medication Interventions: Bed/chair exit alarm, Patient to call before getting OOB, Teach patient to arise slowly    Elimination Interventions: Bed/chair exit alarm, Call light in reach, Patient to call for help with toileting needs, Toileting schedule/hourly rounds, Stay With Me (per policy)    History of Falls Interventions: Bed/chair exit alarm, Investigate reason for fall, Room close to nurse's station         Problem: Nutrition Deficit  Goal: *Optimize nutritional status  Outcome: Progressing Towards Goal

## 2020-04-18 VITALS
DIASTOLIC BLOOD PRESSURE: 72 MMHG | TEMPERATURE: 98 F | WEIGHT: 163.36 LBS | HEART RATE: 80 BPM | OXYGEN SATURATION: 95 % | RESPIRATION RATE: 16 BRPM | HEIGHT: 69 IN | SYSTOLIC BLOOD PRESSURE: 120 MMHG | BODY MASS INDEX: 24.2 KG/M2

## 2020-04-18 LAB
ALBUMIN SERPL-MCNC: 3.9 G/DL (ref 3.5–5)
ALBUMIN/GLOB SERPL: 0.9 {RATIO} (ref 1.1–2.2)
ALP SERPL-CCNC: 158 U/L (ref 45–117)
ALT SERPL-CCNC: 28 U/L (ref 12–78)
ANION GAP SERPL CALC-SCNC: 6 MMOL/L (ref 5–15)
AST SERPL-CCNC: 51 U/L (ref 15–37)
BACTERIA SPEC CULT: NORMAL
BILIRUB DIRECT SERPL-MCNC: 5.1 MG/DL (ref 0–0.2)
BILIRUB SERPL-MCNC: 9.3 MG/DL (ref 0.2–1)
BUN SERPL-MCNC: 10 MG/DL (ref 6–20)
BUN/CREAT SERPL: 13 (ref 12–20)
CALCIUM SERPL-MCNC: 10.4 MG/DL (ref 8.5–10.1)
CHLORIDE SERPL-SCNC: 102 MMOL/L (ref 97–108)
CO2 SERPL-SCNC: 25 MMOL/L (ref 21–32)
CREAT SERPL-MCNC: 0.78 MG/DL (ref 0.7–1.3)
ERYTHROCYTE [DISTWIDTH] IN BLOOD BY AUTOMATED COUNT: 17 % (ref 11.5–14.5)
GLOBULIN SER CALC-MCNC: 4.4 G/DL (ref 2–4)
GLUCOSE SERPL-MCNC: 121 MG/DL (ref 65–100)
HCT VFR BLD AUTO: 29.8 % (ref 36.6–50.3)
HGB BLD-MCNC: 10.4 G/DL (ref 12.1–17)
INR PPP: 2.2 (ref 0.9–1.1)
MCH RBC QN AUTO: 35.7 PG (ref 26–34)
MCHC RBC AUTO-ENTMCNC: 34.9 G/DL (ref 30–36.5)
MCV RBC AUTO: 102.4 FL (ref 80–99)
NRBC # BLD: 0 K/UL (ref 0–0.01)
NRBC BLD-RTO: 0 PER 100 WBC
PLATELET # BLD AUTO: 104 K/UL (ref 150–400)
PMV BLD AUTO: 11.6 FL (ref 8.9–12.9)
POTASSIUM SERPL-SCNC: 4.5 MMOL/L (ref 3.5–5.1)
PROT SERPL-MCNC: 8.3 G/DL (ref 6.4–8.2)
PROTHROMBIN TIME: 21.8 SEC (ref 9–11.1)
RBC # BLD AUTO: 2.91 M/UL (ref 4.1–5.7)
SERVICE CMNT-IMP: NORMAL
SODIUM SERPL-SCNC: 133 MMOL/L (ref 136–145)
WBC # BLD AUTO: 13.2 K/UL (ref 4.1–11.1)

## 2020-04-18 PROCEDURE — 85027 COMPLETE CBC AUTOMATED: CPT

## 2020-04-18 PROCEDURE — C9113 INJ PANTOPRAZOLE SODIUM, VIA: HCPCS | Performed by: HOSPITALIST

## 2020-04-18 PROCEDURE — 85610 PROTHROMBIN TIME: CPT

## 2020-04-18 PROCEDURE — 80048 BASIC METABOLIC PNL TOTAL CA: CPT

## 2020-04-18 PROCEDURE — 74011250637 HC RX REV CODE- 250/637: Performed by: HOSPITALIST

## 2020-04-18 PROCEDURE — 74011250636 HC RX REV CODE- 250/636: Performed by: HOSPITALIST

## 2020-04-18 PROCEDURE — 74011000258 HC RX REV CODE- 258: Performed by: INTERNAL MEDICINE

## 2020-04-18 PROCEDURE — 74011250637 HC RX REV CODE- 250/637: Performed by: INTERNAL MEDICINE

## 2020-04-18 PROCEDURE — P9047 ALBUMIN (HUMAN), 25%, 50ML: HCPCS | Performed by: INTERNAL MEDICINE

## 2020-04-18 PROCEDURE — 36415 COLL VENOUS BLD VENIPUNCTURE: CPT

## 2020-04-18 PROCEDURE — 80076 HEPATIC FUNCTION PANEL: CPT

## 2020-04-18 PROCEDURE — 94760 N-INVAS EAR/PLS OXIMETRY 1: CPT

## 2020-04-18 PROCEDURE — 74011000250 HC RX REV CODE- 250: Performed by: HOSPITALIST

## 2020-04-18 PROCEDURE — 74011250636 HC RX REV CODE- 250/636: Performed by: INTERNAL MEDICINE

## 2020-04-18 RX ORDER — PANTOPRAZOLE SODIUM 20 MG/1
40 TABLET, DELAYED RELEASE ORAL DAILY
Qty: 20 TAB | Refills: 0 | Status: SHIPPED | OUTPATIENT
Start: 2020-04-18 | End: 2020-04-18 | Stop reason: SDUPTHER

## 2020-04-18 RX ORDER — PREDNISONE 20 MG/1
20 TABLET ORAL 2 TIMES DAILY
Qty: 52 TAB | Refills: 0 | Status: SHIPPED | OUTPATIENT
Start: 2020-04-18 | End: 2020-05-14

## 2020-04-18 RX ORDER — PREDNISONE 20 MG/1
20 TABLET ORAL 2 TIMES DAILY
Qty: 50 TAB | Refills: 0 | Status: SHIPPED | OUTPATIENT
Start: 2020-04-18 | End: 2020-04-18 | Stop reason: SDUPTHER

## 2020-04-18 RX ORDER — PANTOPRAZOLE SODIUM 40 MG/1
40 TABLET, DELAYED RELEASE ORAL DAILY
Qty: 20 TAB | Refills: 0 | Status: SHIPPED | OUTPATIENT
Start: 2020-04-18 | End: 2020-05-29 | Stop reason: SDUPTHER

## 2020-04-18 RX ORDER — LANOLIN ALCOHOL/MO/W.PET/CERES
325 CREAM (GRAM) TOPICAL 2 TIMES DAILY WITH MEALS
Qty: 40 TAB | Refills: 0 | Status: SHIPPED | OUTPATIENT
Start: 2020-04-18 | End: 2020-05-29

## 2020-04-18 RX ADMIN — SPIRONOLACTONE 50 MG: 25 TABLET ORAL at 09:27

## 2020-04-18 RX ADMIN — ALBUMIN (HUMAN) 25 G: 0.25 INJECTION, SOLUTION INTRAVENOUS at 12:23

## 2020-04-18 RX ADMIN — PHYTONADIONE 10 MG: 10 INJECTION, EMULSION INTRAMUSCULAR; INTRAVENOUS; SUBCUTANEOUS at 09:30

## 2020-04-18 RX ADMIN — Medication 10 ML: at 07:21

## 2020-04-18 RX ADMIN — ALBUMIN (HUMAN) 25 G: 0.25 INJECTION, SOLUTION INTRAVENOUS at 05:30

## 2020-04-18 RX ADMIN — FUROSEMIDE 20 MG: 20 TABLET ORAL at 09:27

## 2020-04-18 RX ADMIN — METHYLPREDNISOLONE SODIUM SUCCINATE 20 MG: 40 INJECTION, POWDER, FOR SOLUTION INTRAMUSCULAR; INTRAVENOUS at 09:26

## 2020-04-18 RX ADMIN — LACTULOSE 30 ML: 20 SOLUTION ORAL at 09:26

## 2020-04-18 RX ADMIN — Medication 100 MG: at 09:26

## 2020-04-18 RX ADMIN — SODIUM CHLORIDE 40 MG: 9 INJECTION INTRAMUSCULAR; INTRAVENOUS; SUBCUTANEOUS at 07:20

## 2020-04-18 RX ADMIN — FOLIC ACID 1 MG: 1 TABLET ORAL at 09:27

## 2020-04-18 RX ADMIN — FERROUS SULFATE TAB 325 MG (65 MG ELEMENTAL FE) 325 MG: 325 (65 FE) TAB at 07:20

## 2020-04-18 NOTE — PROGRESS NOTES
Patient remains impulsive and doesn't call out when he want to get out of bed to go to the restroom. Bed alarm is on. He continues to have mild tremors on both upper extremities. He is stead on his feet but remains to be a standby assist due to his fall risk. He was asking for pop tarts earlier to which I said we don't have any here, and he said he has some at home. He has no other subjective complaints.

## 2020-04-18 NOTE — DISCHARGE SUMMARY
Discharge Summary       PATIENT ID: Carolyn Hastings  MRN: [id number]   YOB: 1975    DATE OF ADMISSION: 4/14/2020  7:32 AM    DATE OF DISCHARGE: 4/18/2020   PRIMARY CARE PROVIDER: Rafat Thompson MD     ATTENDING PHYSICIAN: Nora Lazar MD    DISCHARGING PROVIDER: Yadiel Evans MD    To contact this individual call [phone] and ask the  to page. If unavailable ask to be transferred the Adult Hospitalist Department. CONSULTATIONS: IP CONSULT TO GENERAL SURGERY  IP CONSULT TO GASTROENTEROLOGY  IP CONSULT TO GASTROENTEROLOGY  IP CONSULT TO HEPATOLOGY    PROCEDURES/SURGERIES: Procedure(s):  ESOPHAGOGASTRODUODENOSCOPY (EGD)    ADMITTING DIAGNOSES & HOSPITAL COURSE:     DISCHARGE DIAGNOSES / PLAN:    78-year-old male patient with past medical history significant for alcoholic liver cirrhosis, alcohol and tobacco abuse, presented to [facility] with epigastric pain.      Jaundice secondary to alcoholic hepatitis , liver cirrhosis  -bilirubin trended down   -US gallbladder distention with mild wall thickening and focal tenderness but no calculi, There are no intraluminal calculi. . The intra and extrahepatic biliary ductal systems are normal and there is no focal hepatic parenchymal abnormality  -GI and Hepatologist on board  -started on steroids for alcoholic hepatitis due to high DF score -currently on IV methyl prednisone- switched to 20 mg prednisone BID for 26 days  -OP hepatology follow up     Alcoholic liver cirrhosis   -s/p EGD on 4/16 -Moderate - Severe portal hypertensive gastropathy of the entire stomach  Gastric varices identified  -INR : 2.2 , receiving vitamin K  -on protonix  -discontinued diuretics per   -on lactulose -discussed with patient to hold for diarrhea           Chronic alcoholism with alcohol withdrawal symptoms:  -on thiamine and folate  -On CIWA protocol while in patient  -he did not require ativan for the last 24 hrs  -counseled about alcohol cessation     Anemia of chronic disease   -H/H stable  -on ferrous sulfate  -iron and ferritin level are normal, iron saturation low      Epigastric pain suspected due to biliary dyskinesia -resolved  -HIDA there is no significant emptying of the gallbladder after CCK administration  -lipase 548  -no evidence of acute cholecystitis  -continue IVF, Protonix, prn zofran  -Surgical Service on board said laparoscopic cholecystectomy is not an option for him with his severe liver failure,Sometime in the future could consider cholecystectomy if he is continuing to have some pain issues  -OP follow up.     Hyponatremia -improved     Hypokalemia - resolved        Thrombocytopenia   -related to liver disease                     PENDING TEST RESULTS:   At the time of discharge the following test results are still pending: none    FOLLOW UP APPOINTMENTS:    Follow-up Information     Follow up With Specialties Details Why Contact Info    Taylor Gupta MD [facility] In 1 week  1200 [facility] 310 [facility]      Robert Day MD Hepatology, Liver Disease, Internal Medicine   200 [facility]  [address] [place] [address] 9      Chelsea Rinaldi MD General Surgery In 2 weeks  217 [facility]  [address]  [address]  [phone]               ADDITIONAL CARE RECOMMENDATIONS:   -Avoid alcohol  -Take prednisone for alcoholic hepatitis  -OP follow up with general surgeon for evaluation of gall bladder surgery  Stop lactulose if you have diarrhea and contact       DIET: Regular Diet      ACTIVITY: Activity as tolerated    WOUND CARE: na    EQUIPMENT needed: na      DISCHARGE MEDICATIONS:  Current Discharge Medication List      START taking these medications    Details   ferrous sulfate 325 mg (65 mg iron) tablet Take 1 Tab by mouth two (2) times daily (with meals).   Qty: 40 Tab, Refills: 0      lactulose (CHRONULAC) 10 gram/15 mL solution Take 15 mL by mouth two (2) times a day. Qty: 1 Bottle, Refills: 0      pantoprazole (PROTONIX) 40 mg tablet Take 1 Tab by mouth daily. Qty: 20 Tab, Refills: 0      predniSONE (DELTASONE) 20 mg tablet Take 20 mg by mouth two (2) times a day for 26 days. Qty: 52 Tab, Refills: 0               NOTIFY YOUR PHYSICIAN FOR ANY OF THE FOLLOWING:   Fever over 101 degrees for 24 hours. Chest pain, shortness of breath, fever, chills, nausea, vomiting, diarrhea, change in mentation, falling, weakness, bleeding. Severe pain or pain not relieved by medications. Or, any other signs or symptoms that you may have questions about. DISPOSITION:  X  Home With:   OT  PT  HH  RN       Long term SNF/Inpatient Rehab    Independent/assisted living    Hospice    Other:       PATIENT CONDITION AT DISCHARGE:     Functional status    Poor     Deconditioned    X Independent      Cognition   X  Lucid     Forgetful     Dementia      Catheters/lines (plus indication)    Morales     PICC     PEG    X None      Code status    X Full code     DNR      PHYSICAL EXAMINATION AT DISCHARGE:    Constitutional:  No acute distress, cooperative, pleasant    ENT:  Icteric sclera, oral mucosa moist, oropharynx benign. Resp:  CTA bilaterally. No wheezing. No accessory muscle use   CV:  Regular rhythm, normal rate, no murmurs, gallops, rubs    GI:  Soft, non distended, non tender. normoactive bowel sounds    Musculoskeletal:  No edema     Neurologic:  Moves all extremities.   AAOx2-3, has UE tremors                         Skin:  Jaundice      CHRONIC MEDICAL DIAGNOSES:  Problem List as of 4/18/2020 Date Reviewed: 4/16/2020          Codes Class Noted - Resolved    Cholecystitis ICD-10-CM: K81.9  ICD-9-CM: 575.10  4/14/2020 - Present        * (Principal) Epigastric pain ICD-10-CM: R10.13  ICD-9-CM: 789.06  4/14/2020 - Present              25 minutes were spent with the patient on counseling and coordination of care    Signed:   Paul Borrego MD  4/18/2020  5:29 PM

## 2020-04-18 NOTE — DISCHARGE INSTRUCTIONS
Discharge Instructions       PATIENT ID: Eduardo Donald  MRN: [id number]   YOB: 1975    DATE OF ADMISSION: 4/14/2020  7:32 AM    DATE OF DISCHARGE: 4/18/2020    PRIMARY CARE PROVIDER: Raf Reynolds MD     ATTENDING PHYSICIAN: Maite Garcia MD  DISCHARGING PROVIDER: Myra Cedeno MD    To contact this individual call [phone] and ask the  to page. If unavailable ask to be transferred the Adult Hospitalist Department. DISCHARGE DIAGNOSES -alcoholic hepatitis,  CONSULTATIONS: IP CONSULT TO GENERAL SURGERY  IP CONSULT TO GASTROENTEROLOGY  IP CONSULT TO GASTROENTEROLOGY  IP CONSULT TO HEPATOLOGY    PROCEDURES/SURGERIES: Procedure(s):  ESOPHAGOGASTRODUODENOSCOPY (EGD)    PENDING TEST RESULTS:   At the time of discharge the following test results are still pending: none    FOLLOW UP APPOINTMENTS:   Follow-up Information     Follow up With Specialties Details Why Contact Info    Raf Reynolds MD Family Practice In 1 week  1200 [facility] 310 [facility]      Dary Ramirez MD Hepatology, Liver Disease, Internal Medicine   200 [facility]  [address] 9      Benita Mccabe MD General Surgery In 2 weeks  217 [facility]  [address] [facility]  [phone]             ADDITIONAL CARE RECOMMENDATIONS:   -Avoid alcohol  -Take prednisone for alcoholic hepatitis  -OP follow up with general surgeon for evaluation of gall bladder surgery  Stop lactulose if you have diarrhea and contact       DIET: Regular Diet      ACTIVITY: Activity as tolerated    WOUND CARE: na    EQUIPMENT needed: na      DISCHARGE MEDICATIONS:   See Medication Reconciliation Form    · It is important that you take the medication exactly as they are prescribed. · Keep your medication in the bottles provided by the pharmacist and keep a list of the medication names, dosages, and times to be taken in your wallet.    · Do not take other medications without consulting your doctor. NOTIFY YOUR PHYSICIAN FOR ANY OF THE FOLLOWING:   Fever over 101 degrees for 24 hours. Chest pain, shortness of breath, fever, chills, nausea, vomiting, diarrhea, change in mentation, falling, weakness, bleeding. Severe pain or pain not relieved by medications. Or, any other signs or symptoms that you may have questions about.       DISPOSITION:   X Home With:   OT  PT  HH  RN       SNF/Inpatient Rehab/LTAC    Independent/assisted living    Hospice    Other:         Signed:   Karmen Marquez MD  4/18/2020  4:11 PM

## 2020-04-18 NOTE — PROGRESS NOTES
I have reviewed discharge instructions with the patient. The patient verbalized understanding. Discharge medications reviewed with patient and appropriate educational materials and side effects teaching were provided. Current Discharge Medication List      START taking these medications    Details   ferrous sulfate 325 mg (65 mg iron) tablet Take 1 Tab by mouth two (2) times daily (with meals). Qty: 40 Tab, Refills: 0      lactulose (CHRONULAC) 10 gram/15 mL solution Take 15 mL by mouth two (2) times a day. Qty: 1 Bottle, Refills: 0      pantoprazole (PROTONIX) 40 mg tablet Take 1 Tab by mouth daily. Qty: 20 Tab, Refills: 0      predniSONE (DELTASONE) 20 mg tablet Take 20 mg by mouth two (2) times a day for 26 days.   Qty: 52 Tab, Refills: 0

## 2020-04-18 NOTE — PROGRESS NOTES
Problem: Discharge Planning  Goal: *Discharge to safe environment  Description: See CM note   Outcome: Progressing Towards Goal     Problem: Patient Education: Go to Patient Education Activity  Goal: Patient/Family Education  Outcome: Progressing Towards Goal     Problem: Pain  Goal: *Control of Pain  Outcome: Progressing Towards Goal     Problem: Patient Education: Go to Patient Education Activity  Goal: Patient/Family Education  Outcome: Progressing Towards Goal     Problem: Falls - Risk of  Goal: *Absence of Falls  Description: Document Ligia Dumas Fall Risk and appropriate interventions in the flowsheet.   Outcome: Progressing Towards Goal  Note: Fall Risk Interventions:  Mobility Interventions: Bed/chair exit alarm         Medication Interventions: Bed/chair exit alarm    Elimination Interventions: Call light in reach, Bed/chair exit alarm    History of Falls Interventions: Bed/chair exit alarm         Problem: Patient Education: Go to Patient Education Activity  Goal: Patient/Family Education  Outcome: Progressing Towards Goal     Problem: Nutrition Deficit  Goal: *Optimize nutritional status  Outcome: Progressing Towards Goal     Problem: Alcohol Withdrawal  Goal: *STG: Participates in treatment plan  Outcome: Progressing Towards Goal  Goal: *STG: Remains safe in hospital  Outcome: Progressing Towards Goal  Goal: *STG: Seeks staff when symptoms of withdrawal increase  Outcome: Progressing Towards Goal  Goal: *STG: Complies with medication therapy  Outcome: Progressing Towards Goal  Goal: *STG: Will identify negative impact of chemical dependency including the use of tobacco, alcohol, and other substances  Outcome: Progressing Towards Goal  Goal: *STG: Verbalizes abstinence as an achievable goal  Outcome: Progressing Towards Goal  Goal: *STG: Able to indentify relapse triggers including interpersonal/social and familial factors  Outcome: Progressing Towards Goal  Goal: *STG: Identify lifestyle changes to support long term sobriety such as vocation, employment, education, and legal issues  Outcome: Progressing Towards Goal  Goal: *STG: Maintains appropriate nutrition and hydration  Outcome: Progressing Towards Goal  Goal: *STG: Vital signs within defined limits  Outcome: Progressing Towards Goal  Goal: *STG/LTG: Relapse prevention plan in place to include housing/aftercare, leisure activities, and spirituality  Outcome: Progressing Towards Goal  Goal: Interventions  Outcome: Progressing Towards Goal

## 2020-04-18 NOTE — PROGRESS NOTES
Problem: Discharge Planning  Goal: *Discharge to safe environment  Description: See CM note   4/18/2020 1610 by Yuri Duran RN  Outcome: Resolved/Not Met  4/18/2020 1105 by Yuri Duran RN  Outcome: Progressing Towards Goal     Problem: Patient Education: Go to Patient Education Activity  Goal: Patient/Family Education  4/18/2020 1610 by Yuri Duran RN  Outcome: Resolved/Not Met  4/18/2020 1105 by Yuri Duran RN  Outcome: Progressing Towards Goal     Problem: Pain  Goal: *Control of Pain  4/18/2020 1610 by Yuri Duran RN  Outcome: Resolved/Not Met  4/18/2020 1105 by Yuri Duran RN  Outcome: Progressing Towards Goal     Problem: Patient Education: Go to Patient Education Activity  Goal: Patient/Family Education  4/18/2020 1610 by Yuri Duran RN  Outcome: Resolved/Not Met  4/18/2020 1105 by Yuri Duran RN  Outcome: Progressing Towards Goal     Problem: Falls - Risk of  Goal: *Absence of Falls  Description: Document Stephanie Orellana Fall Risk and appropriate interventions in the flowsheet.   4/18/2020 1610 by Yuri Duran RN  Outcome: Resolved/Not Met  Note: Fall Risk Interventions:  Mobility Interventions: Bed/chair exit alarm         Medication Interventions: Bed/chair exit alarm    Elimination Interventions: Call light in reach, Bed/chair exit alarm    History of Falls Interventions: Bed/chair exit alarm      4/18/2020 1105 by Yuri Duran RN  Outcome: Progressing Towards Goal  Note: Fall Risk Interventions:  Mobility Interventions: Bed/chair exit alarm         Medication Interventions: Bed/chair exit alarm    Elimination Interventions: Call light in reach, Bed/chair exit alarm    History of Falls Interventions: Bed/chair exit alarm         Problem: Patient Education: Go to Patient Education Activity  Goal: Patient/Family Education  4/18/2020 1610 by Yuri uDran RN  Outcome: Resolved/Not Met  4/18/2020 1105 by Yuri Duran RN  Outcome: Progressing Towards Goal     Problem: Nutrition Deficit  Goal: *Optimize nutritional status  4/18/2020 1610 by Emerita Garcia, RN  Outcome: Resolved/Not Met  4/18/2020 1105 by Emerita Garcia, RN  Outcome: Progressing Towards Goal     Problem: Alcohol Withdrawal  Goal: *STG: Participates in treatment plan  4/18/2020 1610 by Emerita Garcia, RN  Outcome: Resolved/Not Met  4/18/2020 1105 by Emerita Garcia, RN  Outcome: Progressing Towards Goal  Goal: *STG: Remains safe in hospital  4/18/2020 1610 by Emerita Garcia, RN  Outcome: Resolved/Not Met  4/18/2020 1105 by Emerita Garcia, RN  Outcome: Progressing Towards Goal  Goal: *STG: Seeks staff when symptoms of withdrawal increase  4/18/2020 1610 by Emerita Garcia, RN  Outcome: Resolved/Not Met  4/18/2020 1105 by Emerita Garcia, RN  Outcome: Progressing Towards Goal  Goal: *STG: Complies with medication therapy  4/18/2020 1610 by Emerita Garcia, RN  Outcome: Resolved/Not Met  4/18/2020 1105 by Emerita Garcia, RN  Outcome: Progressing Towards Goal  Goal: *STG: Will identify negative impact of chemical dependency including the use of tobacco, alcohol, and other substances  4/18/2020 1610 by Emerita Garcia, RN  Outcome: Resolved/Not Met  4/18/2020 1105 by Emerita Garcia, RN  Outcome: Progressing Towards Goal  Goal: *STG: Verbalizes abstinence as an achievable goal  4/18/2020 1610 by Emerita Garcia, RN  Outcome: Resolved/Not Met  4/18/2020 1105 by Emerita Garcia, RN  Outcome: Progressing Towards Goal  Goal: *STG: Able to indentify relapse triggers including interpersonal/social and familial factors  4/18/2020 1610 by Emerita Garcia, RN  Outcome: Resolved/Not Met  4/18/2020 1105 by Emerita Garcia, RN  Outcome: Progressing Towards Goal  Goal: *STG: Identify lifestyle changes to support long term sobriety such as vocation, employment, education, and legal issues  4/18/2020 1610 by Emerita Garcia, RN  Outcome: Resolved/Not Met  4/18/2020 1105 by Emerita Garcia, RN  Outcome: Progressing Towards Goal  Goal: *STG: Maintains appropriate nutrition and hydration  4/18/2020 1610 by Chantell Mckeon RN  Outcome: Resolved/Not Met  4/18/2020 1105 by Chantell Mckeon RN  Outcome: Progressing Towards Goal  Goal: *STG: Vital signs within defined limits  4/18/2020 1610 by Chantell Mckeon RN  Outcome: Resolved/Not Met  4/18/2020 1105 by Chantell Mckeon RN  Outcome: Progressing Towards Goal  Goal: *STG/LTG: Relapse prevention plan in place to include housing/aftercare, leisure activities, and spirituality  4/18/2020 1610 by Chantell Mckeon RN  Outcome: Resolved/Not Met  4/18/2020 1105 by Chantell Mckeon RN  Outcome: Progressing Towards Goal  Goal: Interventions  4/18/2020 1610 by Chantell Mckeon RN  Outcome: Resolved/Not Met  4/18/2020 1105 by Chantell Mckeon RN  Outcome: Progressing Towards Goal

## 2020-04-18 NOTE — PROGRESS NOTES
Bedside shift change report given to Juan Pablo Kim (oncoming nurse) by Judie Sy (offgoing nurse). Report included the following information SBAR, Kardex and MAR.

## 2020-04-19 DIAGNOSIS — K70.10 ALCOHOLIC HEPATITIS WITHOUT ASCITES: Primary | ICD-10-CM

## 2020-04-19 LAB
BACTERIA SPEC CULT: NORMAL
SERVICE CMNT-IMP: NORMAL

## 2020-04-20 ENCOUNTER — PATIENT OUTREACH (OUTPATIENT)
Dept: FAMILY MEDICINE CLINIC | Age: 45
End: 2020-04-20

## 2020-04-20 NOTE — PROGRESS NOTES
Patient contacted regarding recent discharge and COVID-19 risk   Care Transition Nurse/ Ambulatory Care Manager contacted the patient by telephone to perform post discharge assessment. Verified name and  with patient as identifiers. Reports he feels better, appetite has returned. Still trembly and weak in legs. No stomach discomfort. Denies any respiratory symptoms. Patient has following risk factors of: liver cirrhosis. CTN/ACM reviewed discharge instructions, medical action plan and red flags related to discharge diagnosis. Reviewed and educated them on any new and changed medications related to discharge diagnosis. Advised obtaining a 90-day supply of all daily and as-needed medications. Education provided regarding infection prevention, and signs and symptoms of COVID-19 and when to seek medical attention with patient who verbalized understanding. Discussed exposure protocols and quarantine from [address] you at higher risk for severe illness  and given an opportunity for questions and concerns. The patient agrees to contact the COVID-19 hotline [phone] or PCP office for questions related to their healthcare. CTN/ACM provided contact information for future reference. From CDC: Are you at higher risk for severe illness?  Wash your hands often.  Avoid close contact (6 feet, which is about two arm lengths) with people who are sick.  Put distance between yourself and other people if COVID-19 is spreading in your community.  Clean and disinfect frequently touched surfaces.  Avoid all cruise travel and non-essential air travel.  Call your healthcare professional if you have concerns about COVID-19 and your underlying condition or if you are sick.     For more information on steps you can take to protect yourself, see CDC's How to Protect Yourself      Patient/family/caregiver given information for Surjit Salazar and agrees to enroll - no  Patient's preferred e-mail: na  Patient's preferred phone number: na      Plan for follow-up call in 7-14 days based on severity of symptoms and risk factors.

## 2020-04-21 ENCOUNTER — TELEPHONE (OUTPATIENT)
Dept: HEMATOLOGY | Age: 45
End: 2020-04-21

## 2020-04-21 NOTE — TELEPHONE ENCOUNTER
Contacted patient per Dr. Anastacia Pelletier request. Two patient identifiers obtained. Scheduled patient for VV on Friday 4/24/20. Faxed lab work ordered by Dr. Anastacia Pelletier to [facility] at [facility] per patient request. Patient stated he will go to [facility] on 4/21/20 or 4/22/20.

## 2020-04-22 LAB
BACTERIA SPEC CULT: NORMAL
SERVICE CMNT-IMP: NORMAL

## 2020-04-23 LAB
ALBUMIN SERPL-MCNC: 4.4 G/DL (ref 4–5)
ALBUMIN/GLOB SERPL: 1 {RATIO} (ref 1.2–2.2)
ALP SERPL-CCNC: 199 IU/L (ref 39–117)
ALT SERPL-CCNC: 48 IU/L (ref 0–44)
AST SERPL-CCNC: 79 IU/L (ref 0–40)
BASOPHILS # BLD AUTO: 0.1 X10E3/UL (ref 0–0.2)
BASOPHILS NFR BLD AUTO: 0 %
BILIRUB SERPL-MCNC: 7.9 MG/DL (ref 0–1.2)
BUN SERPL-MCNC: 14 MG/DL (ref 6–24)
BUN/CREAT SERPL: 17 (ref 9–20)
CALCIUM SERPL-MCNC: 10 MG/DL (ref 8.7–10.2)
CHLORIDE SERPL-SCNC: 99 MMOL/L (ref 96–106)
CO2 SERPL-SCNC: 23 MMOL/L (ref 20–29)
CREAT SERPL-MCNC: 0.84 MG/DL (ref 0.76–1.27)
EOSINOPHIL # BLD AUTO: 0.4 X10E3/UL (ref 0–0.4)
EOSINOPHIL NFR BLD AUTO: 3 %
ERYTHROCYTE [DISTWIDTH] IN BLOOD BY AUTOMATED COUNT: 15.7 % (ref 11.6–15.4)
GLOBULIN SER CALC-MCNC: 4.3 G/DL (ref 1.5–4.5)
GLUCOSE SERPL-MCNC: 78 MG/DL (ref 65–99)
HCT VFR BLD AUTO: 34.1 % (ref 37.5–51)
HGB BLD-MCNC: 12.8 G/DL (ref 13–17.7)
IMM GRANULOCYTES # BLD AUTO: 0.2 X10E3/UL (ref 0–0.1)
IMM GRANULOCYTES NFR BLD AUTO: 1 %
INR PPP: 1.6 (ref 0.8–1.2)
LYMPHOCYTES # BLD AUTO: 3.1 X10E3/UL (ref 0.7–3.1)
LYMPHOCYTES NFR BLD AUTO: 21 %
MCH RBC QN AUTO: 34.4 PG (ref 26.6–33)
MCHC RBC AUTO-ENTMCNC: 37.5 G/DL (ref 31.5–35.7)
MCV RBC AUTO: 92 FL (ref 79–97)
MONOCYTES # BLD AUTO: 1.4 X10E3/UL (ref 0.1–0.9)
MONOCYTES NFR BLD AUTO: 9 %
MORPHOLOGY BLD-IMP: ABNORMAL
NEUTROPHILS # BLD AUTO: 9.9 X10E3/UL (ref 1.4–7)
NEUTROPHILS NFR BLD AUTO: 66 %
PLATELET # BLD AUTO: 97 X10E3/UL (ref 150–450)
POTASSIUM SERPL-SCNC: 4.4 MMOL/L (ref 3.5–5.2)
PROT SERPL-MCNC: 8.7 G/DL (ref 6–8.5)
PROTHROMBIN TIME: 17.1 SEC (ref 9.1–12)
RBC # BLD AUTO: 3.72 X10E6/UL (ref 4.14–5.8)
SODIUM SERPL-SCNC: 138 MMOL/L (ref 134–144)
WBC # BLD AUTO: 15 X10E3/UL (ref 3.4–10.8)

## 2020-04-24 ENCOUNTER — VIRTUAL VISIT (OUTPATIENT)
Dept: HEMATOLOGY | Age: 45
End: 2020-04-24

## 2020-04-24 DIAGNOSIS — K70.10 ALCOHOLIC HEPATITIS WITHOUT ASCITES: Primary | ICD-10-CM

## 2020-04-24 PROBLEM — R10.13 EPIGASTRIC PAIN: Status: RESOLVED | Noted: 2020-04-14 | Resolved: 2020-04-24

## 2020-04-24 PROBLEM — D64.9 ANEMIA: Status: ACTIVE | Noted: 2020-04-24

## 2020-04-24 PROBLEM — D69.6 THROMBOCYTOPENIA (HCC): Status: ACTIVE | Noted: 2020-04-24

## 2020-04-24 PROBLEM — K81.9 CHOLECYSTITIS: Status: RESOLVED | Noted: 2020-04-14 | Resolved: 2020-04-24

## 2020-04-24 PROBLEM — R17 JAUNDICE: Status: ACTIVE | Noted: 2020-04-24

## 2020-04-24 NOTE — PROGRESS NOTES
[facility], MD, Glenny De Jesus MD Larose Plana, PA-C Price Barer, ACNP-BC     April S Declan, [facility]   TONIE HerreraP-JOSE    [facility]-BC       Grisel Santoro [address]    at [address], [address] [address]  22.    [phone]    FAX: [address] [place] [address], [address]    [phone]    FAX: [phone]       Patient Care Team:  Victor M Gerard MD as PCP - General (Family Practice)  Victor M Gerard MD as PCP - [facility] Provider  Eduin Bui RN as Care Transitions Nurse (Family Practice)      Problem List  Date Reviewed: 4/24/2020          Codes Class Noted    Alcoholic hepatitis ROM-15-XS: K70.10  ICD-9-CM: 571.1  4/24/2020        Anemia ICD-10-CM: D64.9  ICD-9-CM: 285.9  4/24/2020        Jaundice ICD-10-CM: R17  ICD-9-CM: 782.4  4/24/2020        Thrombocytopenia ([address] ICD-10-CM: D69.6  ICD-9-CM: 287.5  4/24/2020              VIRTUAL TELEHEALTH VISIT PERFORMED DUE TO COVID-19 EPIDEMIC    CONSENT:  Socorro Freed, who was seen by synchronous, real-time, audio-video technology, and/or his healthcare decision maker, is aware that this patient-initiated, Telehealth encounter on 4/24/2020 is a billable service, with coverage as determined by his insurance carrier. He is aware that he may receive a bill and has provided verbal consent to proceed. This patient was evaluated during a Virtual Telehealth visit. A caregiver was present if appropriate.  Due to this being a TeleHealth encounter performed during the AYZTO-[id number] public health emergency, the physical examination was limited to that listed in the [facility].    This is the first office visit for Socorro Freed at [address]. We participated in the care of this patient during a recent hospitalization at [facility] for treatment of alcoholic hepatitis. The patient was discharged 1 week ago and this is the transitional care office visit. The hospital course, active problem list, all pertinent past medical history, medications, endoscopic studies, radiologic findings and laboratory findings related to the liver disorder were reviewed with the patient. The patient is a 38 y.o.  male who was found to have chronic liver disease and cirrhosis in 1/2020   when he underwent a CT scan following hemroidectomy. There is a long history of excessive alcohol intake and for the past 6 months prior to the recent hospitialization he was consuming 1 gallon of whisky every 3 days. He was hospitalized with alcohol hepatitis and a DF 60. He was started on steroids and discharged on prednisone 20 mg BID. Jonathan Jimenez An assessment of liver fibrosis with biopsy or elastography has not been performed. Serologic evaluation for markers of chronic liver disease was negative     The patient has developed the following complications of cirrhosis: ascites, edema,     The patient has the following symptoms which are thought to be due to the liver disease:  fatigue, pain in the right side over the liver, He is eating well and ambulating without limitations. The patient is not currently experiencing the following symptoms of liver disease:  yellowing of the eyes or skin has resolved, problems concentrating, swelling of the abdomen, swelling of the lower extremities, hematemesis,   hematochezia. The patient has Moderate limitations in functional activities which can be attributed to the liver disease. ASSESSMENT AND PLAN:  Cirrhosis  Suspect he has developed cirrhosis secondary to alcohol.      Serologic testing for causes of chronic liver disease were negative     The diagnosis of cirrhosis is based upon imaging, laboratory studies, complications of cirrhosis. The CTP is 7. Child class B. The MELD score is 20. Liver transaminases are elevated. ALP is elevated. Total bilirubin is better but still elevated. Serum albumin is   normal.  INR is better but still prolonged. The platelet count is depressed. Will perform laboratory testing to monitor liver function and degree of liver injury. This included BMP, hepatic panel, CBC with platelet count, INR. The need to perform an assessment of liver fibrosis was discussed with the patient. The Fibroscan can assess liver fibrosis and determine if a patient has advanced fibrosis or cirrhosis without the need for liver biopsy. This will be performed after 3-6 months of abstinence. The Fibroscan can be repeated annually or as often as clinically indicated to assess for fibrosis progression and/or regression. Alcohol hepatitis  The diagnosis is based upon a history of consuming alcohol in excess on a daily basis for many years. The DF was up to 61 when he was hospitalized last week. The patient is being treated with prednisone 40 mg every day for 28 days. The ending date is 5/14/2020    The patient has remained abstinent from alcohol since hospital discharge. His girl friend who also consumed alcohol in excess has stopped drinking as well. All alcohol has been removed from the home. Ascites   Ascites has resolved with low dose diuretics. He was discharged from the hospital off diuretics and has not reaccumualted ascites. Lower extremity edema  Edema has resolved with low dose diuretics which were then stopped at discharge. Edema has not reaccumualted     Screening for Esophageal varices   The patient does not have esophageal or gastric varices. The last EGD to assess for varices was performed in 4/2020. Hepatic encephalopathy   Overt HE has not developed to date.     There is no need for treatment with lactulose and/or Xifaxan at this time.    Thrombocytopenia   This is secondary to cirrhosis or bone marrow suppression from alcohol. There is no evidence of overt bleeding. No treatment is required. The platelet count is adequate for the patient to undergo procedures without the need for platelet transfusion or platelet growth factors. Anemia   This is due to multifactorial causes including portal hypertension with chronic GI blood loss, bone marrow suppression secondary to malnutrition and alcohol. The serum ferritin is within the normal range  The FE saturation is elevated    The HB is increasing since he left the hospital with abstinence. Screening for Hepatocellular Carcinoma  HCC screening has recently been performed and does not suggest [address] The next liver imaging study will be performed in 10/2020. Treatment of other medical problems in patients with chronic liver disease  There are no contraindications for the patient to take most medications that are necessary for treatment of other medical issues. The patient consumes alcohol on a daily basis or has recently stopped consuming alcohol. Regular alcohol use increases the risk of toxicity from acetaminophen. This analgesic should be avoided until the patient has been abstinent from alcohol for 6 months. Counseling for alcohol in patients with chronic liver disease  The patient was counseled regarding alcohol consumption and the effect of alcohol on chronic liver disease. It was recommended that all alcohol consumption be stopped and the patient be abstinent from alcohol    Osteoporosis  The risk of osteoporosis is increased in patients with cirrhosis. DEXA bone density to assess for osteoporosis has not been performed. This should be ordered by the patients primary care physician. Vaccinations   Vaccination for viral hepatitis B is recommended since the patient has no serologic evidence of previous exposure or vaccination with immunity.   Vaccination for viral hepatitis A is not needed. The patient has serologic evidence of prior exposure or vaccination with immunity. Routine vaccinations against other bacterial and viral agents can be performed as indicated. Annual flu vaccination should be administered if indicated. ALLERGIES  No Known Allergies    MEDICATIONS  Current Outpatient Medications   Medication Sig    ferrous sulfate 325 mg (65 mg iron) tablet Take 1 Tab by mouth two (2) times daily (with meals).  lactulose (CHRONULAC) 10 gram/15 mL solution Take 15 mL by mouth two (2) times a day.  pantoprazole (PROTONIX) 40 mg tablet Take 1 Tab by mouth daily.  predniSONE (DELTASONE) 20 mg tablet Take 20 mg by mouth two (2) times a day for 26 days. No current facility-administered medications for this visit. SYSTEM REVIEW NOT RELATED TO LIVER DISEASE OR REVIEWED ABOVE:  Constitution systems: Negative for fever, chills, weight gain, weight loss. Eyes: Negative for visual changes. ENT: Negative for sore throat, painful swallowing. Respiratory: Negative for cough, hemoptysis, SOB. Cardiology: Negative for chest pain, palpitations. GI:  Negative for constipation or diarrhea. : Negative for urinary frequency, dysuria, hematuria, nocturia. Skin: Negative for rash. Hematology: Negative for easy bruising, blood clots. Musculo-skelatal: Negative for back pain, muscle pain, weakness. Neurologic: Negative for headaches, dizziness, vertigo, memory problems not related to HE. Psychology: Negative for anxiety, depression. FAMILY HISTORY:  The patient has no knowledge of the father's medical condition. The mother Has/had the following chronic disease(s): None. There is no family history of liver disease.       SOCIAL HISTORY:  The patient has never been . The patient has no children. The patient currently smokes 1/2 pack of tobacco daily. The patient consumes alcohol in excess.   1 gallon of whiskey every 3 days.  The patient used to work as . The patient has not worked since 1/2020. PHYSICAL EXAMINATION PERFORMED BY VIRTUAL TELEHEALTH:  VS: Not performed   General: No acute distress. Eyes: Sclera anicteric. ENT: No oral lesions. Skin: No rashes. spider angiomata. No jaundice. Abdomen: No obvious distention suggesting ascites. Extremities: No edema. No muscle wasting. Neurologic: Alert and oriented. Cranial nerves grossly intact. LABORATORY STUDIES:  Liver [place] [address] Units 4/22/2020 4/18/2020   WBC 3.4 - 10.8 x10E3/uL 15.0 (H) 13.2 (H)   ANC 1.4 - 7.0 x10E3/uL 9.9 (H)    HGB 13.0 - 17.7 g/dL 12.8 (L) 10.4 (L)    - 450 x10E3/uL 97 (LL) 104 (L)   INR 0.8 - 1.2 1.6 (H) 2.2 (H)   AST 0 - 40 IU/L 79 (H) 51 (H)   ALT 0 - 44 IU/L 48 (H) 28   Alk Phos 39 - 117 IU/L 199 (H) 158 (H)   Bili, Total 0.0 - 1.2 mg/dL 7.9 (H) 9.3 (H)   Bili, Direct 0.0 - 0.2 MG/DL  5.1 (H)   Albumin 4.0 - 5.0 g/dL 4.4 3.9   BUN 6 - 24 mg/dL 14 10   Creat 0.76 - 1.27 mg/dL 0.84 0.78   Na 134 - 144 mmol/L 138 133 (L)   K 3.5 - 5.2 mmol/L 4.4 4.5   Cl 96 - 106 mmol/L 99 102   CO2 20 - 29 mmol/L 23 25   Glucose 65 - 99 mg/dL 78 121 (H)     SEROLOGIES:  Serologies Latest Ref Rng & Units 4/15/2020   Hep A Ab, Total Negative   Positive (A)   Hep B Surface Ag Index 0.32   Hep B Surface Ag Interp NEG   Negative   Hep B Core Ab, Total Negative   Negative   Hep B Surface Ab mIU/mL 4.08   Hep B Surface Ab Interp NR   NONREACTIVE   Hep C Ab NR   NONREACTIVE   Ferritin 26 - 388 NG/   Iron % Saturation 20 - 50 % 82 (H)     LIVER HISTOLOGY:  Not available or performed    ENDOSCOPIC PROCEDURES:  4/2020. EGD performed by [facility]. No esophageal varices. No gastric varices. Severe portal gastropathy. RADIOLOGY:  3/2020. Ultrasound of liver. Echogenic consistent with cirrhosis. No liver mass lesions. No dilated bile ducts. No ascites. 4/2020. Ultrasound of liver. Echogenic consistent with cirrhosis. No liver mass lesions. No dilated bile ducts. No ascites. OTHER TESTING:  Not available or performed    FOLLOW-UP:  Pursuant to the emergency declaration under the [address] [place] [address], [facility] waiver authority and the [employer] Act, this Virtual  Visit was conducted, with the patient's (and/or their legal guardian's) consent, to reduce the patient's risk of exposure to COVID-19 and provide necessary medical care. Services were provided through a video synchronous discussion virtually to substitute for an in-person clinic visit. The patient was located in their home. The provider was located in the [place] [address] office. Because of the COVID-19 epidemic a follow-up appointment will be performed via TeleHealth in 4 weeks for routine monitoring. Orders to obtain laboratory testing will be mailed to the patient and obtained 1 week prior to the next TeleHealth encounter.       Alvin Armstrong MD  [address]  [address] [place] [address].  [phone]  [address] [place] [address]

## 2020-05-01 ENCOUNTER — TELEPHONE (OUTPATIENT)
Dept: HEMATOLOGY | Age: 45
End: 2020-05-01

## 2020-05-01 NOTE — TELEPHONE ENCOUNTER
Left voice mail for patient to return call to reschedule follow up from July to end of May with Mehreen Manriquez per Dr. Martha Penn.

## 2020-05-23 LAB
ALBUMIN SERPL-MCNC: 2.7 G/DL (ref 4–5)
ALP SERPL-CCNC: 189 IU/L (ref 39–117)
ALT SERPL-CCNC: 49 IU/L (ref 0–44)
AST SERPL-CCNC: 66 IU/L (ref 0–40)
BASOPHILS # BLD AUTO: 0.1 X10E3/UL (ref 0–0.2)
BASOPHILS NFR BLD AUTO: 1 %
BILIRUB DIRECT SERPL-MCNC: 1.97 MG/DL (ref 0–0.4)
BILIRUB SERPL-MCNC: 3.7 MG/DL (ref 0–1.2)
BUN SERPL-MCNC: 6 MG/DL (ref 6–24)
BUN/CREAT SERPL: 8 (ref 9–20)
CALCIUM SERPL-MCNC: 8.6 MG/DL (ref 8.7–10.2)
CHLORIDE SERPL-SCNC: 103 MMOL/L (ref 96–106)
CO2 SERPL-SCNC: 24 MMOL/L (ref 20–29)
CREAT SERPL-MCNC: 0.78 MG/DL (ref 0.76–1.27)
EOSINOPHIL # BLD AUTO: 0.1 X10E3/UL (ref 0–0.4)
EOSINOPHIL NFR BLD AUTO: 1 %
ERYTHROCYTE [DISTWIDTH] IN BLOOD BY AUTOMATED COUNT: 15.6 % (ref 11.6–15.4)
GLUCOSE SERPL-MCNC: 66 MG/DL (ref 65–99)
HCT VFR BLD AUTO: 33.2 % (ref 37.5–51)
HGB BLD-MCNC: 11.9 G/DL (ref 13–17.7)
IMM GRANULOCYTES # BLD AUTO: 0 X10E3/UL (ref 0–0.1)
IMM GRANULOCYTES NFR BLD AUTO: 1 %
INR PPP: 1.4 (ref 0.8–1.2)
LYMPHOCYTES # BLD AUTO: 1.4 X10E3/UL (ref 0.7–3.1)
LYMPHOCYTES NFR BLD AUTO: 22 %
MCH RBC QN AUTO: 35.5 PG (ref 26.6–33)
MCHC RBC AUTO-ENTMCNC: 35.8 G/DL (ref 31.5–35.7)
MCV RBC AUTO: 99 FL (ref 79–97)
MONOCYTES # BLD AUTO: 0.9 X10E3/UL (ref 0.1–0.9)
MONOCYTES NFR BLD AUTO: 13 %
MORPHOLOGY BLD-IMP: ABNORMAL
NEUTROPHILS # BLD AUTO: 4.1 X10E3/UL (ref 1.4–7)
NEUTROPHILS NFR BLD AUTO: 62 %
PLATELET # BLD AUTO: 68 X10E3/UL (ref 150–450)
POTASSIUM SERPL-SCNC: 4 MMOL/L (ref 3.5–5.2)
PROT SERPL-MCNC: 6.3 G/DL (ref 6–8.5)
PROTHROMBIN TIME: 14.8 SEC (ref 9.1–12)
RBC # BLD AUTO: 3.35 X10E6/UL (ref 4.14–5.8)
SODIUM SERPL-SCNC: 137 MMOL/L (ref 134–144)
WBC # BLD AUTO: 6.5 X10E3/UL (ref 3.4–10.8)

## 2020-05-29 ENCOUNTER — VIRTUAL VISIT (OUTPATIENT)
Dept: HEMATOLOGY | Age: 45
End: 2020-05-29

## 2020-05-29 DIAGNOSIS — K70.10 ALCOHOLIC HEPATITIS WITHOUT ASCITES: ICD-10-CM

## 2020-05-29 DIAGNOSIS — R33.9 URINARY RETENTION: ICD-10-CM

## 2020-05-29 DIAGNOSIS — R60.1 GENERALIZED EDEMA: ICD-10-CM

## 2020-05-29 DIAGNOSIS — K70.30 ALCOHOLIC CIRRHOSIS OF LIVER WITHOUT ASCITES (HCC): ICD-10-CM

## 2020-05-29 DIAGNOSIS — K21.9 GASTROESOPHAGEAL REFLUX DISEASE WITHOUT ESOPHAGITIS: Primary | ICD-10-CM

## 2020-05-29 DIAGNOSIS — R19.7 DIARRHEA, UNSPECIFIED TYPE: ICD-10-CM

## 2020-05-29 RX ORDER — FUROSEMIDE 20 MG/1
20 TABLET ORAL DAILY
Qty: 30 TAB | Refills: 5 | Status: SHIPPED | OUTPATIENT
Start: 2020-05-29 | End: 2020-06-29 | Stop reason: DRUGHIGH

## 2020-05-29 RX ORDER — PANTOPRAZOLE SODIUM 40 MG/1
40 TABLET, DELAYED RELEASE ORAL DAILY
Qty: 20 TAB | Refills: 0 | Status: SHIPPED | OUTPATIENT
Start: 2020-05-29 | End: 2020-06-29

## 2020-05-29 NOTE — PROGRESS NOTES
[address] [facility], MD, [address], Cite Hussain Shaffer, MD Jane Carter PA-C Christella Hardy, [facility]     Mehreen Manriquez, [facility]   DAVID Gonzalez-JOSE Almonte, [facility]       Griselcuca Santoro Rodney De Maya 136    at [address], [address] [place] [address], [facility] 22.    [phone]    FAX: [address] [place] [address] Avenue    at [address], [address] [place] [address],[address]    [address] [facility] [address]: [phone]       Patient Care Team:  Cristin Ramirez MD as PCP - General (Family Practice)  Cristin Ramirez MD as PCP - [facility] Provider      Problem List  Date Reviewed: 5/29/2020          Codes Class Noted    Urinary retention ICD-10-CM: R33.9  ICD-9-CM: 788.20  1/68/8272        Alcoholic cirrhosis ([facility] 75.) JOT-05-CR: K70.30  ICD-9-CM: 571.2  5/29/2020        Generalized edema ICD-10-CM: R60.1  ICD-9-CM: 782.3  9/02/1360        Alcoholic hepatitis LINDA-56-CM: K70.10  ICD-9-CM: 571.1  4/24/2020        Anemia ICD-10-CM: D64.9  ICD-9-CM: 285.9  4/24/2020        Jaundice ICD-10-CM: R17  ICD-9-CM: 782.4  4/24/2020        Thrombocytopenia ([facility] 75.) ICD-10-CM: D69.6  ICD-9-CM: 287.5  4/24/2020              VIRTUAL TELEHEALTH VISIT PERFORMED DUE TO COVID-19 EPIDEMIC    CONSENT:  Noel Benites, who was seen by synchronous, real-time, audio-video technology, and/or his healthcare decision maker, is aware that this patient-initiated, Telehealth encounter on 5/29/2020 is a billable service, with coverage as determined by his insurance carrier. He is aware that he may receive a bill and has provided verbal consent to proceed. This patient was evaluated during a Virtual Telehealth visit. A caregiver was present if appropriate.  Due to this being a TeleHealth encounter performed during the VJUUZ-95 public health emergency, the physical examination was limited to that listed in the [facility] returns to the [facility] 32 for management of cirrhosis secondary to alcoholic liver disease. The active problem list, all pertinent past medical history, medications, radiologic findings and laboratory findings related to the liver disorder were reviewed with the patient. The patient is a 38 y.o.  male who was found to have chronic liver disease and cirrhosis in 1/2020 when he underwent a CT scan following hemroidectomy. There is a long history of excessive alcohol intake and for the past 6 months prior to the recent hospitialization he was consuming 1 gallon of whisky every 3 days. He was hospitalized with alcohol hepatitis 4/2020 and had a DF 60. He was started on steroids and discharged on prednisone 20 mg BID. The prednisone was completed 5/14/2020. An assessment of liver fibrosis with biopsy or elastography has not been performed. Serologic evaluation for markers of chronic liver disease was negative     The patient has developed the following complications of cirrhosis: ascites, edema,     The patient has the following symptoms which are thought to be due to the liver disease:  fatigue, pain in the right side over the liver, He is eating well and ambulating without limitations. The patient is not currently experiencing the following symptoms of liver disease:  yellowing of the eyes or skin has resolved, problems concentrating, swelling of the abdomen, swelling of the lower extremities, hematemesis,   hematochezia. The patient has Moderate limitations in functional activities which can be attributed to the liver disease. ASSESSMENT AND PLAN:  Cirrhosis  Suspect he has developed cirrhosis secondary to alcohol. Serologic testing for causes of chronic liver disease were negative.      The diagnosis of cirrhosis is based upon imaging, laboratory studies, complications of cirrhosis. The CTP is 9. Child class B. The MELD score is 15. Liver transaminases are elevated. ALP is elevated. Total bilirubin is improved but still elevated. Serum albumin is   normal.  INR is better but still prolonged. The platelet count is depressed. Will perform laboratory testing to monitor liver function and degree of liver injury. This included BMP, hepatic panel, CBC with platelet count, INR. The need to perform an assessment of liver fibrosis was discussed with the patient. The Fibroscan can assess liver fibrosis and determine if a patient has advanced fibrosis or cirrhosis without the need for liver biopsy. This will be performed after 3-6 months of abstinence. The Fibroscan can be repeated annually or as often as clinically indicated to assess for fibrosis progression and/or regression. Alcohol hepatitis  The diagnosis is based upon a history of consuming alcohol in excess on a daily basis for many years. The DF was up to 61 when he was hospitalized last week. The patient completed treatment with prednisone 40 mg every day for 28 days 5/14/2020. The patient has remained abstinent from alcohol since hospital discharge 4/12/2020. His significant other, who also consumed alcohol in excess, has stopped drinking as well. He is currently not involved in a treatment program.     Ascites   This resolved on low dose diuretics and was discontinued on discharge. The patient feels this has returned. Will add furosemide 20 mg daily. Lower extremity edema  Edema has resolved with low dose diuretics which were then stopped at discharge. Edema has now returned. Will add furosemide 20 mg daily. Discussed lowering intake of high sodium containing foods. Diarrhea  The patient is having 5-6 stools per day. Will order lipase to rule out pancreatitis. Urinary Retention   This has been a chronic issue.  A prostate exam was conducted by the PCP, patient reports this was normal.   Will order a urinalysis with reflex culture, treat as needed. GERD  The patient was discharged on protonix and discontinued 2 weeks ago. He is now experiencing epigastric pain. Will restart Protonix at 40 mg daily. Screening for Esophageal varices   The patient does not have esophageal or gastric varices. The last EGD to assess for varices was performed in 4/2020. Hepatic encephalopathy   Overt HE has not developed to date. There is no need for treatment with lactulose and/or Xifaxan at this time. Thrombocytopenia   This is secondary to cirrhosis or bone marrow suppression from alcohol. There is no evidence of overt bleeding. No treatment is required. The platelet count is adequate for the patient to undergo procedures without the need for platelet transfusion or platelet growth factors. Anemia   This is due to multifactorial causes including portal hypertension with chronic GI blood loss, bone marrow suppression secondary to malnutrition and alcohol. The serum ferritin is within the normal range  The FE saturation is elevated    The HB is increasing since he left the hospital with abstinence. Screening for Hepatocellular Carcinoma  HCC screening has recently been performed and does not suggest [facility].. The next liver imaging study will be performed in 10/2020. Treatment of other medical problems in patients with chronic liver disease  There are no contraindications for the patient to take most medications that are necessary for treatment of other medical issues. The patient consumes alcohol on a daily basis or has recently stopped consuming alcohol. Regular alcohol use increases the risk of toxicity from acetaminophen. This analgesic should be avoided until the patient has been abstinent from alcohol for 6 months.       Counseling for alcohol in patients with chronic liver disease  The patient was counseled regarding alcohol consumption and the effect of alcohol on chronic liver disease. It was recommended that all alcohol consumption be stopped and the patient be abstinent from alcohol    Osteoporosis  The risk of osteoporosis is increased in patients with cirrhosis. DEXA bone density to assess for osteoporosis has not been performed. This should be ordered by the patients primary care physician. Vaccinations   Vaccination for viral hepatitis B is recommended since the patient has no serologic evidence of previous exposure or vaccination with immunity. Vaccination for viral hepatitis A is not needed. The patient has serologic evidence of prior exposure or vaccination with immunity. Routine vaccinations against other bacterial and viral agents can be performed as indicated. Annual flu vaccination should be administered if indicated. ALLERGIES  No Known Allergies    MEDICATIONS  Current Outpatient Medications   Medication Sig    pantoprazole (PROTONIX) 40 mg tablet Take 1 Tab by mouth daily.  furosemide (LASIX) 20 mg tablet Take 1 Tab by mouth daily. No current facility-administered medications for this visit. SYSTEM REVIEW NOT RELATED TO LIVER DISEASE OR REVIEWED ABOVE:  Constitution systems: Negative for fever, chills, weight gain, weight loss. Eyes: Negative for visual changes. ENT: Negative for sore throat, painful swallowing. Respiratory: Negative for cough, hemoptysis, SOB. Cardiology: Negative for chest pain, palpitations. GI:  Negative for constipation or diarrhea. : Negative for urinary frequency, dysuria, hematuria, nocturia. Skin: Negative for rash. Hematology: Negative for easy bruising, blood clots. Musculo-skelatal: Negative for back pain, muscle pain, weakness. Neurologic: Negative for headaches, dizziness, vertigo, memory problems not related to HE. Psychology: Negative for anxiety, depression.      FAMILY HISTORY:  The patient has no knowledge of the father's medical condition. The mother Has/had the following chronic disease(s): None. There is no family history of liver disease.       SOCIAL HISTORY:  The patient has never been . The patient has no children. The patient currently smokes 1/2 pack of tobacco daily. The patient consumes alcohol in excess. 1 gallon of whiskey every 3 days. The patient used to work as . The patient has not worked since 1/2020. PHYSICAL EXAMINATION PERFORMED BY VIRTUAL TELEHEALTH:  VS: Not performed   General: No acute distress. Eyes: Sclera anicteric. ENT: No oral lesions. Skin: No rashes. spider angiomata. No jaundice. Abdomen: No obvious distention suggesting ascites. Extremities: No edema. No muscle wasting. Neurologic: Alert and oriented. Cranial nerves grossly intact.       LABORATORY STUDIES:  [address] & Units 5/22/2020 4/22/2020   WBC 3.4 - 10.8 x10E3/uL 6.5 15.0 (H)   ANC 1.4 - 7.0 x10E3/uL 4.1 9.9 (H)   HGB 13.0 - 17.7 g/dL 11.9 (L) 12.8 (L)    - 450 x10E3/uL 68 (LL) 97 (LL)   INR 0.8 - 1.2 1.4 (H) 1.6 (H)   AST 0 - 40 IU/L 66 (H) 79 (H)   ALT 0 - 44 IU/L 49 (H) 48 (H)   Alk Phos 39 - 117 IU/L 189 (H) 199 (H)   Bili, Total 0.0 - 1.2 mg/dL 3.7 (H) 7.9 (H)   Bili, Direct 0.00 - 0.40 mg/dL 1.97 (H)    Albumin 4.0 - 5.0 g/dL 2.7 (L) 4.4   BUN 6 - 24 mg/dL 6 14   Creat 0.76 - 1.27 mg/dL 0.78 0.84   Na 134 - 144 mmol/L 137 138   K 3.5 - 5.2 mmol/L 4.0 4.4   Cl 96 - 106 mmol/L 103 99   CO2 20 - 29 mmol/L 24 23   Glucose 65 - 99 mg/dL 66 78     [facility] Latest Ref Rng & Units 4/18/2020   WBC 3.4 - 10.8 x10E3/uL 13.2 (H)   ANC 1.4 - 7.0 x10E3/uL    HGB 13.0 - 17.7 g/dL 10.4 (L)    - 450 x10E3/uL 104 (L)   INR 0.8 - 1.2 2.2 (H)   AST 0 - 40 IU/L 51 (H)   ALT 0 - 44 IU/L 28   Alk Phos 39 - 117 IU/L 158 (H)   Bili, Total 0.0 - 1.2 mg/dL 9.3 (H)   Bili, Direct 0.00 - 0.40 mg/dL 5.1 (H)   Albumin 4.0 - 5.0 g/dL 3.9 BUN 6 - 24 mg/dL 10   Creat 0.76 - 1.27 mg/dL 0.78   Na 134 - 144 mmol/L 133 (L)   K 3.5 - 5.2 mmol/L 4.5   Cl 96 - 106 mmol/L 102   CO2 20 - 29 mmol/L 25   Glucose 65 - 99 mg/dL 121 (H)     SEROLOGIES:  Serologies Latest Ref Rng & Units 4/15/2020   Hep A Ab, Total Negative   Positive (A)   Hep B Surface Ag Index 0.32   Hep B Surface Ag Interp NEG   Negative   Hep B Core Ab, Total Negative   Negative   Hep B Surface Ab mIU/mL 4.08   Hep B Surface Ab Interp NR   NONREACTIVE   Hep C Ab NR   NONREACTIVE   Ferritin 26 - 388 NG/   Iron % Saturation 20 - 50 % 82 (H)     LIVER HISTOLOGY:  Not available or performed    ENDOSCOPIC PROCEDURES:  4/2020. EGD performed by [facility]. No esophageal varices. No gastric varices. Severe portal gastropathy. RADIOLOGY:  3/2020. Ultrasound of liver. Echogenic consistent with cirrhosis. No liver mass lesions. No dilated bile ducts. No ascites. 4/2020. Ultrasound of liver. Echogenic consistent with cirrhosis. No liver mass lesions. No dilated bile ducts. No ascites. OTHER TESTING:  Not available or performed    FOLLOW-UP:  Pursuant to the emergency declaration under the [facility], [facility] waiver authority and the [employer] Act, this Virtual  Visit was conducted, with the patient's (and/or their legal guardian's) consent, to reduce the patient's risk of exposure to COVID-19 and provide necessary medical care. Services were provided through a video synchronous discussion virtually to substitute for an in-person clinic visit. The patient was located in their home. The provider was located in the [place] [address] office. Because of the COVID-19 epidemic a follow-up appointment will be performed via TeleHealth in 4 weeks for routine monitoring.   Orders to obtain laboratory testing will be mailed to the patient and obtained 1 week prior to the next TeleHealth encounter. LIAT Simental-BC  [address] [address] [address] [facility] 77 [id number] Jose Mariscal, 2000 [facility] 22.  201 [facility]

## 2020-05-30 DIAGNOSIS — K70.10 ALCOHOLIC HEPATITIS WITHOUT ASCITES: ICD-10-CM

## 2020-05-30 DIAGNOSIS — R19.7 DIARRHEA, UNSPECIFIED TYPE: ICD-10-CM

## 2020-06-01 ENCOUNTER — DOCUMENTATION ONLY (OUTPATIENT)
Dept: HEMATOLOGY | Age: 45
End: 2020-06-01

## 2020-06-20 LAB
ALBUMIN SERPL-MCNC: 2.4 G/DL (ref 4–5)
ALP SERPL-CCNC: 149 IU/L (ref 39–117)
ALT SERPL-CCNC: 28 IU/L (ref 0–44)
APPEARANCE UR: CLEAR
AST SERPL-CCNC: 73 IU/L (ref 0–40)
BACTERIA #/AREA URNS HPF: NORMAL /[HPF]
BASOPHILS # BLD AUTO: 0.1 X10E3/UL (ref 0–0.2)
BASOPHILS NFR BLD AUTO: 1 %
BILIRUB DIRECT SERPL-MCNC: 2 MG/DL (ref 0–0.4)
BILIRUB SERPL-MCNC: 4.7 MG/DL (ref 0–1.2)
BILIRUB UR QL STRIP: NEGATIVE
BUN SERPL-MCNC: 8 MG/DL (ref 6–24)
BUN/CREAT SERPL: 8 (ref 9–20)
CALCIUM SERPL-MCNC: 8.6 MG/DL (ref 8.7–10.2)
CASTS URNS QL MICRO: NORMAL /LPF
CHLORIDE SERPL-SCNC: 100 MMOL/L (ref 96–106)
CO2 SERPL-SCNC: 25 MMOL/L (ref 20–29)
COLOR UR: YELLOW
CREAT SERPL-MCNC: 0.98 MG/DL (ref 0.76–1.27)
EOSINOPHIL # BLD AUTO: 0.3 X10E3/UL (ref 0–0.4)
EOSINOPHIL NFR BLD AUTO: 5 %
EPI CELLS #/AREA URNS HPF: NORMAL /HPF (ref 0–10)
ERYTHROCYTE [DISTWIDTH] IN BLOOD BY AUTOMATED COUNT: 15.2 % (ref 11.6–15.4)
GLUCOSE SERPL-MCNC: 93 MG/DL (ref 65–99)
GLUCOSE UR QL: NEGATIVE
HCT VFR BLD AUTO: 33.6 % (ref 37.5–51)
HGB BLD-MCNC: 12.1 G/DL (ref 13–17.7)
HGB UR QL STRIP: NEGATIVE
IMM GRANULOCYTES # BLD AUTO: 0 X10E3/UL (ref 0–0.1)
IMM GRANULOCYTES NFR BLD AUTO: 0 %
INR PPP: 1.5 (ref 0.8–1.2)
KETONES UR QL STRIP: NEGATIVE
LEUKOCYTE ESTERASE UR QL STRIP: NEGATIVE
LIPASE SERPL-CCNC: 89 U/L (ref 13–78)
LYMPHOCYTES # BLD AUTO: 2.2 X10E3/UL (ref 0.7–3.1)
LYMPHOCYTES NFR BLD AUTO: 29 %
MCH RBC QN AUTO: 33.6 PG (ref 26.6–33)
MCHC RBC AUTO-ENTMCNC: 36 G/DL (ref 31.5–35.7)
MCV RBC AUTO: 93 FL (ref 79–97)
MICRO URNS: NORMAL
MICRO URNS: NORMAL
MONOCYTES # BLD AUTO: 1 X10E3/UL (ref 0.1–0.9)
MONOCYTES NFR BLD AUTO: 11 %
MORPHOLOGY BLD-IMP: ABNORMAL
MUCOUS THREADS URNS QL MICRO: PRESENT
NEUTROPHILS # BLD AUTO: 3.9 X10E3/UL (ref 1.4–7)
NEUTROPHILS NFR BLD AUTO: 54 %
NITRITE UR QL STRIP: NEGATIVE
PH UR STRIP: 6 [PH] (ref 5–7.5)
PLATELET # BLD AUTO: 74 X10E3/UL (ref 150–450)
POTASSIUM SERPL-SCNC: 3.8 MMOL/L (ref 3.5–5.2)
PROT SERPL-MCNC: 7 G/DL (ref 6–8.5)
PROT UR QL STRIP: NEGATIVE
PROTHROMBIN TIME: 15.7 SEC (ref 9.1–12)
RBC # BLD AUTO: 3.6 X10E6/UL (ref 4.14–5.8)
RBC #/AREA URNS HPF: NORMAL /HPF (ref 0–2)
SODIUM SERPL-SCNC: 138 MMOL/L (ref 134–144)
SP GR UR: 1.02 (ref 1–1.03)
URINALYSIS REFLEX, 377202: NORMAL
UROBILINOGEN UR STRIP-MCNC: 1 MG/DL (ref 0.2–1)
WBC # BLD AUTO: 7.5 X10E3/UL (ref 3.4–10.8)
WBC #/AREA URNS HPF: NORMAL /HPF (ref 0–5)

## 2020-06-29 ENCOUNTER — VIRTUAL VISIT (OUTPATIENT)
Dept: HEMATOLOGY | Age: 45
End: 2020-06-29

## 2020-06-29 DIAGNOSIS — R60.1 GENERALIZED EDEMA: Primary | ICD-10-CM

## 2020-06-29 DIAGNOSIS — K70.30 ALCOHOLIC CIRRHOSIS OF LIVER WITHOUT ASCITES (HCC): ICD-10-CM

## 2020-06-29 RX ORDER — SPIRONOLACTONE 100 MG/1
100 TABLET, FILM COATED ORAL DAILY
Qty: 30 TAB | Refills: 5 | Status: SHIPPED | OUTPATIENT
Start: 2020-06-29 | End: 2020-08-05

## 2020-06-29 RX ORDER — FUROSEMIDE 40 MG/1
40 TABLET ORAL DAILY
Qty: 30 TAB | Refills: 5 | Status: SHIPPED | OUTPATIENT
Start: 2020-06-29

## 2020-06-29 NOTE — PROGRESS NOTES
[address] [facility], [place], [address], Cite Hussain Shaffer, MD Andreas Patel PA-C Leotha Broad, ACNP-BC     Mehreen Manriquez, BannerNP-BC   TONIE LawrenceP-JOSE Yung, [facility]       Grisel Santoro Rodney De Maya 136    at [address], [address] [place] [address], [facility] 22.    [phone]    FAX: [address] [place] [address] Avenue    [address], [address] [place] [address],[address]    [address] [place] [address] Street: [phone]       Patient Care Team:  Maryse Hartley MD as PCP - General (Family Practice)  Maryse Hartley MD as PCP - [facility] Provider      Problem List  Date Reviewed: 5/29/2020          Codes Class Noted    Urinary retention ICD-10-CM: R33.9  ICD-9-CM: 788.20  1/39/7474        Alcoholic cirrhosis ([facility] YNH-44-FH: K70.30  ICD-9-CM: 571.2  5/29/2020        Generalized edema ICD-10-CM: R60.1  ICD-9-CM: 782.3  0/87/6443        Alcoholic hepatitis BEF-33-PM: K70.10  ICD-9-CM: 571.1  4/24/2020        Anemia ICD-10-CM: D64.9  ICD-9-CM: 285.9  4/24/2020        Jaundice ICD-10-CM: R17  ICD-9-CM: 782.4  4/24/2020        Thrombocytopenia ([facility] ICD-10-CM: D69.6  ICD-9-CM: 287.5  4/24/2020              VIRTUAL TELEHEALTH VISIT PERFORMED DUE TO COVID-19 EPIDEMIC    CONSENT:  Hair Vaughan, who was seen by synchronous, real-time, audio-video technology, and/or his healthcare decision maker, is aware that this patient-initiated, Telehealth encounter on 6/29/2020 is a billable service, with coverage as determined by his insurance carrier. He is aware that he may receive a bill and has provided verbal consent to proceed. This patient was evaluated during a Virtual Telehealth visit. A caregiver was present if appropriate.  Due to this being a TeleHealth encounter performed during the YKZEF-06 public health emergency, the physical examination was limited to that listed in the [facility] returns to the [facility] 32 for management of cirrhosis secondary to alcoholic liver disease. The active problem list, all pertinent past medical history, medications, radiologic findings and laboratory findings related to the liver disorder were reviewed with the patient. The patient is a 54 y.o.  male who was found to have chronic liver disease and cirrhosis in 1/2020 when he underwent a CT scan following hemroidectomy. There is a long history of excessive alcohol intake and for the past 6 months prior to the recent hospitialization he was consuming 1 gallon of whisky every 3 days. He was hospitalized with alcohol hepatitis 4/2020 and had a DF 60. He was started on steroids and discharged on prednisone 20 mg BID. The prednisone was completed 5/14/2020. An assessment of liver fibrosis with biopsy or elastography has not been performed. Serologic evaluation for markers of chronic liver disease was negative     Since the last office visit the patient has had moderate to severe lower extremity edema. Furosemide 20 mg was started 5/2020 with no relief in symptoms. The patient reports difficulty putting on shoes. He denies symptoms of ascites. The patient has developed the following complications of cirrhosis: ascites, edema. The patient has the following symptoms which are thought to be due to the liver disease:  fatigue, pain in the right side over the liver, He is eating well and ambulating without limitations. The patient is not currently experiencing the following symptoms of liver disease:  yellowing of the eyes or skin has resolved, problems concentrating, swelling of the abdomen, swelling of the lower extremities, hematemesis,   hematochezia.     The patient has Moderate limitations in functional activities which can be attributed to the liver disease. ASSESSMENT AND PLAN:  Cirrhosis  Suspect he has developed cirrhosis secondary to alcohol. Serologic testing for causes of chronic liver disease were negative. The diagnosis of cirrhosis is based upon imaging, laboratory studies, complications of cirrhosis. The CTP is 9. Child class B. The MELD score is 15. AST is elevated. ALT is normal.  ALP is elevated. Total bilirubin is improved but still elevated. Serum albumin is normal.  INR is better but still prolonged. The platelet count is depressed. Have performed laboratory testing to monitor liver function and degree of liver injury. This included BMP, hepatic panel, CBC with platelet count, INR. The need to perform an assessment of liver fibrosis was discussed with the patient. The Fibroscan can assess liver fibrosis and determine if a patient has advanced fibrosis or cirrhosis without the need for liver biopsy. This will be performed after 3-6 months of abstinence. The Fibroscan can be repeated annually or as often as clinically indicated to assess for fibrosis progression and/or regression. Alcohol hepatitis  The diagnosis is based upon a history of consuming alcohol in excess on a daily basis for many years. The DF was up to 61 when he was hospitalized last week. The patient completed treatment with prednisone 40 mg every day for 28 days 5/14/2020. The patient has remained abstinent from alcohol since hospital discharge 4/12/2020. His significant other, who also consumed alcohol in excess, has stopped drinking as well. He is currently not involved in a treatment program.     Ascites   This resolved on low dose diuretics and was discontinued on discharge. The patient feels this is not resolving on furosemide 20 mg daily. Will increase diuretics to furosemide 40 mg, aldactone 100 mg. Repeat blood work in 2 weeks.      Lower extremity edema  Edema has resolved with low dose diuretics which were then stopped at discharge. Edema is moderate to severe. He is unable to wear shoes comfortably. Will increase diuretics to furosemide 40 mg , spironolactone 100 mg. Discussed lowering intake of high sodium containing foods. Diarrhea  Improved over the past 4 weeks. He is now having formed stools. Urinary Retention   This has been a chronic issue. A prostate exam was conducted by the PCP, patient reports this was normal.   Urine with reflex was normal.    GERD  He completed 4 weeks of protonix. This is now resolved. Screening for Esophageal varices   The patient does not have esophageal or gastric varices. The last EGD to assess for varices was performed in 4/2020. Hepatic encephalopathy   Overt HE has not developed to date. There is no need for treatment with lactulose and/or Xifaxan at this time. Thrombocytopenia   This is secondary to cirrhosis or bone marrow suppression from alcohol. There is no evidence of overt bleeding. No treatment is required. The platelet count is adequate for the patient to undergo procedures without the need for platelet transfusion or platelet growth factors. Anemia   This is due to multifactorial causes including portal hypertension with chronic GI blood loss, bone marrow suppression secondary to malnutrition and alcohol. The serum ferritin is within the normal range  The FE saturation is elevated  The HGB has been stable ranging 12.1-12.8. Screening for Hepatocellular Carcinoma  HCC screening has recently been performed and does not suggest [facility] The next liver imaging study will be performed in 10/2020. Treatment of other medical problems in patients with chronic liver disease  There are no contraindications for the patient to take most medications that are necessary for treatment of other medical issues. The patient consumes alcohol on a daily basis or has recently stopped consuming alcohol.   Regular alcohol use increases the risk of toxicity from acetaminophen. This analgesic should be avoided until the patient has been abstinent from alcohol for 6 months. Counseling for alcohol in patients with chronic liver disease  The patient was counseled regarding alcohol consumption and the effect of alcohol on chronic liver disease. It was recommended that all alcohol consumption be stopped and the patient be abstinent from alcohol    Osteoporosis  The risk of osteoporosis is increased in patients with cirrhosis. DEXA bone density to assess for osteoporosis has not been performed. This should be ordered by the patients primary care physician. Vaccinations   Vaccination for viral hepatitis B is recommended since the patient has no serologic evidence of previous exposure or vaccination with immunity. Vaccination for viral hepatitis A is not needed. The patient has serologic evidence of prior exposure or vaccination with immunity. Routine vaccinations against other bacterial and viral agents can be performed as indicated. Annual flu vaccination should be administered if indicated. ALLERGIES  No Known Allergies    MEDICATIONS  Current Outpatient Medications   Medication Sig    spironolactone (ALDACTONE) 100 mg tablet Take 1 Tab by mouth daily.  furosemide (LASIX) 40 mg tablet Take 1 Tab by mouth daily. No current facility-administered medications for this visit. SYSTEM REVIEW NOT RELATED TO LIVER DISEASE OR REVIEWED ABOVE:  Constitution systems: Negative for fever, chills, weight gain, weight loss. Eyes: Negative for visual changes. ENT: Negative for sore throat, painful swallowing. Respiratory: Negative for cough, hemoptysis, SOB. Cardiology: Negative for chest pain, palpitations. GI:  Negative for constipation or diarrhea. : Negative for urinary frequency, dysuria, hematuria, nocturia. Skin: Negative for rash. Hematology: Negative for easy bruising, blood clots.     Musculo-skelatal: Negative for back pain, muscle pain, weakness. Neurologic: Negative for headaches, dizziness, vertigo, memory problems not related to HE. Psychology: Negative for anxiety, depression. FAMILY HISTORY:  The patient has no knowledge of the father's medical condition. The mother Has/had the following chronic disease(s): None. There is no family history of liver disease.       SOCIAL HISTORY:  The patient has never been . The patient has no children. The patient currently smokes 1/2 pack of tobacco daily. The patient consumes alcohol in excess. 1 gallon of whiskey every 3 days. The patient used to work as . The patient has not worked since 1/2020. PHYSICAL EXAMINATION PERFORMED BY VIRTUAL TELEHEALTH:  VS: Not performed   General: No acute distress. Eyes: Sclera anicteric. ENT: No oral lesions. Skin: No rashes. spider angiomata. No jaundice. Abdomen: No obvious distention suggesting ascites. Extremities: No edema. No muscle wasting. Neurologic: Alert and oriented. Cranial nerves grossly intact.       LABORATORY STUDIES:  Liver [place] of [address] Units 6/19/2020 5/22/2020   WBC 3.4 - 10.8 x10E3/uL 7.5 6.5   ANC 1.4 - 7.0 x10E3/uL 3.9 4.1   HGB 13.0 - 17.7 g/dL 12.1 (L) 11.9 (L)    - 450 x10E3/uL 74 (LL) 68 (LL)   INR 0.8 - 1.2 1.5 (H) 1.4 (H)   AST 0 - 40 IU/L 73 (H) 66 (H)   ALT 0 - 44 IU/L 28 49 (H)   Alk Phos 39 - 117 IU/L 149 (H) 189 (H)   Bili, Total 0.0 - 1.2 mg/dL 4.7 (H) 3.7 (H)   Bili, Direct 0.00 - 0.40 mg/dL 2.00 (H) 1.97 (H)   Albumin 4.0 - 5.0 g/dL 2.4 (L) 2.7 (L)   BUN 6 - 24 mg/dL 8 6   Creat 0.76 - 1.27 mg/dL 0.98 0.78   Na 134 - 144 mmol/L 138 137   K 3.5 - 5.2 mmol/L 3.8 4.0   Cl 96 - 106 mmol/L 100 103   CO2 20 - 29 mmol/L 25 24   Glucose 65 - 99 mg/dL 93 66     Liver Lucas of [address] [place] [address] Ref Rng & Units 4/22/2020   WBC 3.4 - 10.8 x10E3/uL 15.0 (H)   ANC 1.4 - 7.0 x10E3/uL 9.9 (H)   HGB 13.0 - 17.7 g/dL 12.8 (L)   PLT 150 - 450 x10E3/uL 97 (LL)   INR 0.8 - 1.2 1.6 (H)   AST 0 - 40 IU/L 79 (H)   ALT 0 - 44 IU/L 48 (H)   Alk Phos 39 - 117 IU/L 199 (H)   Bili, Total 0.0 - 1.2 mg/dL 7.9 (H)   Bili, Direct 0.00 - 0.40 mg/dL    Albumin 4.0 - 5.0 g/dL 4.4   BUN 6 - 24 mg/dL 14   Creat 0.76 - 1.27 mg/dL 0.84   Na 134 - 144 mmol/L 138   K 3.5 - 5.2 mmol/L 4.4   Cl 96 - 106 mmol/L 99   CO2 20 - 29 mmol/L 23   Glucose 65 - 99 mg/dL 78       SEROLOGIES:  Serologies Latest Ref Rng & Units 4/15/2020   Hep A Ab, Total Negative   Positive (A)   Hep B Surface Ag Index 0.32   Hep B Surface Ag Interp NEG   Negative   Hep B Core Ab, Total Negative   Negative   Hep B Surface Ab mIU/mL 4.08   Hep B Surface Ab Interp NR   NONREACTIVE   Hep C Ab NR   NONREACTIVE   Ferritin 26 - 388 NG/   Iron % Saturation 20 - 50 % 82 (H)     LIVER HISTOLOGY:  Not available or performed    ENDOSCOPIC PROCEDURES:  4/2020. EGD performed by [facility]. No esophageal varices. No gastric varices. Severe portal gastropathy. RADIOLOGY:  3/2020. Ultrasound of liver. Echogenic consistent with cirrhosis. No liver mass lesions. No dilated bile ducts. No ascites. 4/2020. Ultrasound of liver. Echogenic consistent with cirrhosis. No liver mass lesions. No dilated bile ducts. No ascites. OTHER TESTING:  Not available or performed    FOLLOW-UP:  Pursuant to the emergency declaration under the [facility]5 waiver authority and the [employer] and [employer] Act, this Virtual  Visit was conducted, with the patient's (and/or their legal guardian's) consent, to reduce the patient's risk of exposure to COVID-19 and provide necessary medical care. Services were provided through a video synchronous discussion virtually to substitute for an in-person clinic visit. The patient was located in their home. The provider was located in the [address].        An in person follow up will be performed in 4 weeks. Orders to obtain laboratory testing will be mailed to the patient and completed in 2 weeks. [facility] [address] [id number] Jose Mariscal, [address] [place] [address] 22.  [address] [facility]

## 2020-06-30 DIAGNOSIS — K70.30 ALCOHOLIC CIRRHOSIS OF LIVER WITHOUT ASCITES (HCC): ICD-10-CM

## 2020-07-07 ENCOUNTER — DOCUMENTATION ONLY (OUTPATIENT)
Dept: HEMATOLOGY | Age: 45
End: 2020-07-07

## 2020-07-07 DIAGNOSIS — K21.9 GASTROESOPHAGEAL REFLUX DISEASE WITHOUT ESOPHAGITIS: Primary | ICD-10-CM

## 2020-07-07 RX ORDER — PANTOPRAZOLE SODIUM 40 MG/1
40 TABLET, DELAYED RELEASE ORAL DAILY
Qty: 30 TAB | Refills: 3 | Status: SHIPPED | OUTPATIENT
Start: 2020-07-07 | End: 2020-08-04

## 2020-07-07 NOTE — PROGRESS NOTES
Patient called with concerns regarding medication and nausea. He was recently started on aldactone which could cause dehydration. He recently completed protonix and has a history of GERD. Will restart protonix. Advised he increase water intake slightly. The lower extremity edema is improving. Consider decreasing to furosemide 20 and spironolactone 50 if symptoms do not improve with protonix.

## 2020-07-11 LAB
ALBUMIN SERPL-MCNC: 3.2 G/DL (ref 4–5)
ALP SERPL-CCNC: 176 IU/L (ref 39–117)
ALT SERPL-CCNC: 32 IU/L (ref 0–44)
AST SERPL-CCNC: 80 IU/L (ref 0–40)
BILIRUB DIRECT SERPL-MCNC: 2.22 MG/DL (ref 0–0.4)
BILIRUB SERPL-MCNC: 5.9 MG/DL (ref 0–1.2)
BUN SERPL-MCNC: 10 MG/DL (ref 6–24)
BUN/CREAT SERPL: 10 (ref 9–20)
CALCIUM SERPL-MCNC: 9.7 MG/DL (ref 8.7–10.2)
CHLORIDE SERPL-SCNC: 96 MMOL/L (ref 96–106)
CO2 SERPL-SCNC: 24 MMOL/L (ref 20–29)
CREAT SERPL-MCNC: 1.03 MG/DL (ref 0.76–1.27)
GLUCOSE SERPL-MCNC: 86 MG/DL (ref 65–99)
INR PPP: 1.5 (ref 0.8–1.2)
POTASSIUM SERPL-SCNC: 4.1 MMOL/L (ref 3.5–5.2)
PROT SERPL-MCNC: 8.1 G/DL (ref 6–8.5)
PROTHROMBIN TIME: 15.4 SEC (ref 9.1–12)
SODIUM SERPL-SCNC: 134 MMOL/L (ref 134–144)

## 2020-07-12 LAB
BASOPHILS # BLD AUTO: 0.1 X10E3/UL (ref 0–0.2)
BASOPHILS NFR BLD AUTO: 1 %
EOSINOPHIL # BLD AUTO: 0.3 X10E3/UL (ref 0–0.4)
EOSINOPHIL NFR BLD AUTO: 4 %
ERYTHROCYTE [DISTWIDTH] IN BLOOD BY AUTOMATED COUNT: 15.4 % (ref 11.6–15.4)
HCT VFR BLD AUTO: 34.4 % (ref 37.5–51)
HGB BLD-MCNC: 13 G/DL (ref 13–17.7)
IMM GRANULOCYTES # BLD AUTO: 0 X10E3/UL (ref 0–0.1)
IMM GRANULOCYTES NFR BLD AUTO: 0 %
LYMPHOCYTES # BLD AUTO: 1.9 X10E3/UL (ref 0.7–3.1)
LYMPHOCYTES NFR BLD AUTO: 27 %
MCH RBC QN AUTO: 33.2 PG (ref 26.6–33)
MCHC RBC AUTO-ENTMCNC: 37.8 G/DL (ref 31.5–35.7)
MCV RBC AUTO: 88 FL (ref 79–97)
MONOCYTES # BLD AUTO: 0.9 X10E3/UL (ref 0.1–0.9)
MONOCYTES NFR BLD AUTO: 13 %
MORPHOLOGY BLD-IMP: ABNORMAL
NEUTROPHILS # BLD AUTO: 4 X10E3/UL (ref 1.4–7)
NEUTROPHILS NFR BLD AUTO: 55 %
PLATELET # BLD AUTO: 74 X10E3/UL (ref 150–450)
RBC # BLD AUTO: 3.91 X10E6/UL (ref 4.14–5.8)
WBC # BLD AUTO: 7.1 X10E3/UL (ref 3.4–10.8)

## 2020-07-13 NOTE — PROGRESS NOTES
Letter sent to patient regarding blood work results which are stable with some improvement. We will continue to monitor him closely.

## 2020-08-04 ENCOUNTER — VIRTUAL VISIT (OUTPATIENT)
Dept: HEMATOLOGY | Age: 45
End: 2020-08-04

## 2020-08-04 DIAGNOSIS — R60.1 GENERALIZED EDEMA: ICD-10-CM

## 2020-08-04 DIAGNOSIS — K70.30 ALCOHOLIC CIRRHOSIS OF LIVER WITHOUT ASCITES (HCC): Primary | ICD-10-CM

## 2020-08-04 PROCEDURE — 99214 OFFICE O/P EST MOD 30 MIN: CPT | Performed by: NURSE PRACTITIONER

## 2020-08-04 NOTE — PROGRESS NOTES
[address] [facility], Zackery HENDRIX, Ramses Jones MD Lillia Ellison, YUN Anton, ACNP-BC     Mehreen Manriquez, AGPCNP-BC   Apoorva Krishnamurthy FNP-JOSE Shah, [facility]       Grisel Deputado Rodney De Maya 136    at [address], [address] [place] [address], [facility].    [phone]    FAX: [address] [place] [address], [address] [place] [address],[address]    [address]: [phone]       Patient Care Team:  Joel Jackson MD as PCP - General (Family Medicine)  Joel Jackson MD as PCP - [facility] Provider      Problem List  Date Reviewed: 8/5/2020          Codes Class Noted    Urinary retention ICD-10-CM: R33.9  ICD-9-CM: 788.20  9/07/8289        Alcoholic cirrhosis ([facility].) IOJ-21-US: K70.30  ICD-9-CM: 571.2  5/29/2020        Generalized edema ICD-10-CM: R60.1  ICD-9-CM: 782.3  8/22/5901        Alcoholic hepatitis JYG-16-YX: K70.10  ICD-9-CM: 571.1  4/24/2020        Anemia ICD-10-CM: D64.9  ICD-9-CM: 285.9  4/24/2020        Jaundice ICD-10-CM: R17  ICD-9-CM: 782.4  4/24/2020        Thrombocytopenia ([facility] 75.) ICD-10-CM: D69.6  ICD-9-CM: 287.5  4/24/2020              VIRTUAL TELEHEALTH VISIT PERFORMED DUE TO COVID-19 EPIDEMIC    CONSENT:  Nicci Harding, who was seen by synchronous, real-time, audio-video technology, and/or his healthcare decision maker, is aware that this patient-initiated, Telehealth encounter on 8/4/2020 is a billable service, with coverage as determined by his insurance carrier. He is aware that he may receive a bill and has provided verbal consent to proceed. This patient was evaluated during a Virtual Telehealth visit. A caregiver was present if appropriate.  Due to this being a TeleHealth encounter performed during the XAETD26 public health emergency, the physical examination was limited to that listed in the [facility] 51 returns to the [facility] 32 for management of cirrhosis secondary to alcoholic liver disease. The active problem list, all pertinent past medical history, medications, radiologic findings and laboratory findings related to the liver disorder were reviewed with the patient. The patient is a 54 y.o.  male who was found to have chronic liver disease and cirrhosis in 1/2020 when he underwent a CT scan following hemroidectomy. There is a long history of excessive alcohol intake and for the past 6 months prior to the recent hospitialization he was consuming 1 gallon of whisky every 3 days. He was hospitalized with alcohol hepatitis 4/2020 and had a DF 60. He was started on steroids and discharged on prednisone 20 mg BID. The prednisone was completed 5/14/2020. An assessment of liver fibrosis with biopsy or elastography has not been performed. Serologic evaluation for markers of chronic liver disease was negative     Since the last office visit the patient has discontinued protonix and is only taking furosemide 40 mg daily. He reports an improvement in lower extremity edema. The patient has developed the following complications of cirrhosis: ascites, edema. The patient has the following symptoms which are thought to be due to the liver disease:  fatigue, pain in the right side over the liver. Appetite has improved. The patient is not currently experiencing the following symptoms of liver disease:  yellowing of the eyes or skin has resolved, problems concentrating, swelling of the abdomen, swelling of the lower extremities, hematemesis,   hematochezia. The patient has Moderate limitations in functional activities which can be attributed to the liver disease.         ASSESSMENT AND PLAN:  Cirrhosis  Suspect he has developed cirrhosis secondary to alcohol. Serologic testing for causes of chronic liver disease were negative. The diagnosis of cirrhosis is based upon imaging, laboratory studies, complications of cirrhosis. The CTP is 9. Child class B. The MELD score is 15. AST is elevated. ALT is normal.  ALP is elevated. Total bilirubin is improved but still elevated. Serum albumin is normal.  INR is better but still prolonged. The platelet count is depressed. Have performed laboratory testing to monitor liver function and degree of liver injury. This included BMP, hepatic panel, CBC with platelet count, INR. The need to perform an assessment of liver fibrosis was discussed with the patient. The Fibroscan can assess liver fibrosis and determine if a patient has advanced fibrosis or cirrhosis without the need for liver biopsy. This will be performed after 3-6 months of abstinence. The Fibroscan can be repeated annually or as often as clinically indicated to assess for fibrosis progression and/or regression. Alcohol hepatitis  The diagnosis is based upon a history of consuming alcohol in excess on a daily basis for many years. The DF was up to 61 when he was hospitalized last week. The patient completed treatment with prednisone 40 mg every day for 28 days 5/14/2020. The patient has remained abstinent from alcohol since hospital discharge 4/12/2020. His significant other, who also consumed alcohol in excess, has stopped drinking as well. He is currently not involved in a treatment program.     Ascites   Is well controlled on furosemide 40 mg daily. Lower extremity edema  Mild to moderate at times. Currently on furosemide 40 mg daily. Advised he may add spironolactone  mg as needed. He may also take 20 mg of furosemide and 50 mg of spironolactone. Discussed lowering intake of high sodium containing foods. Diarrhea  Resolved. Urinary Retention   This has been a chronic issue.  A prostate exam was conducted by the PCP, patient reports this was normal.   Urine with reflex was normal.    Screening for Esophageal varices   The patient does not have esophageal or gastric varices. The last EGD to assess for varices was performed in 4/2020. Repeat EGD will be due 4/2022. Hepatic encephalopathy   Overt HE has not developed to date. There is no need for treatment with lactulose and/or Xifaxan at this time. Thrombocytopenia   This is secondary to cirrhosis or bone marrow suppression from alcohol. There is no evidence of overt bleeding. No treatment is required. The platelet count is adequate for the patient to undergo procedures without the need for platelet transfusion or platelet growth factors. Anemia   This is due to multifactorial causes including portal hypertension with chronic GI blood loss, bone marrow suppression secondary to malnutrition and alcohol. The serum ferritin is within the normal range  The FE saturation is elevated. The HGB is now normal.    Screening for Hepatocellular Carcinoma  HCC screening has recently been performed and does not suggest [address] The next liver imaging study will be performed in 10/2020. This was ordered today. Treatment of other medical problems in patients with chronic liver disease  There are no contraindications for the patient to take most medications that are necessary for treatment of other medical issues. The patient consumes alcohol on a daily basis or has recently stopped consuming alcohol. Regular alcohol use increases the risk of toxicity from acetaminophen. This analgesic should be avoided until the patient has been abstinent from alcohol for 6 months. Counseling for alcohol in patients with chronic liver disease  The patient was counseled regarding alcohol consumption and the effect of alcohol on chronic liver disease.   It was recommended that all alcohol consumption be stopped and the patient be abstinent from alcohol    Osteoporosis  The risk of osteoporosis is increased in patients with cirrhosis. DEXA bone density to assess for osteoporosis has not been performed. This should be ordered by the patients primary care physician. Vaccinations   Vaccination for viral hepatitis B is recommended since the patient has no serologic evidence of previous exposure or vaccination with immunity. Vaccination for viral hepatitis A is not needed. The patient has serologic evidence of prior exposure or vaccination with immunity. Routine vaccinations against other bacterial and viral agents can be performed as indicated. Annual flu vaccination should be administered if indicated. ALLERGIES  No Known Allergies    MEDICATIONS  Current Outpatient Medications   Medication Sig    furosemide (LASIX) 40 mg tablet Take 1 Tab by mouth daily. No current facility-administered medications for this visit. SYSTEM REVIEW NOT RELATED TO LIVER DISEASE OR REVIEWED ABOVE:  Constitution systems: Negative for fever, chills, weight gain, weight loss. Eyes: Negative for visual changes. ENT: Negative for sore throat, painful swallowing. Respiratory: Negative for cough, hemoptysis, SOB. Cardiology: Negative for chest pain, palpitations. GI:  Negative for constipation or diarrhea. : Negative for urinary frequency, dysuria, hematuria, nocturia. Skin: Negative for rash. Hematology: Negative for easy bruising, blood clots. Musculo-skelatal: Negative for back pain, muscle pain, weakness. Neurologic: Negative for headaches, dizziness, vertigo, memory problems not related to HE. Psychology: Negative for anxiety, depression. FAMILY HISTORY:  The patient has no knowledge of the father's medical condition. The mother Has/had the following chronic disease(s): None. There is no family history of liver disease.       SOCIAL HISTORY:  The patient has never been . The patient has no children.      The patient currently smokes 1/2 pack of tobacco daily. The patient consumes alcohol in excess. 1 gallon of whiskey every 3 days. The patient used to work as . The patient has not worked since 1/2020. PHYSICAL EXAMINATION PERFORMED BY VIRTUAL TELEHEALTH:  VS: Not performed   General: No acute distress. Eyes: Sclera anicteric. ENT: No oral lesions. Skin: No rashes. spider angiomata. No jaundice. Abdomen: No obvious distention suggesting ascites. Extremities: No edema. No muscle wasting. Neurologic: Alert and oriented. Cranial nerves grossly intact. LABORATORY STUDIES:  [address] & Units 7/10/2020 6/19/2020   WBC 3.4 - 10.8 x10E3/uL 7.1 7.5   ANC 1.4 - 7.0 x10E3/uL 4.0 3.9   HGB 13.0 - 17.7 g/dL 13.0 12.1 (L)    - 450 x10E3/uL 74 (LL) 74 (LL)   INR 0.8 - 1.2 1.5 (H) 1.5 (H)   AST 0 - 40 IU/L 80 (H) 73 (H)   ALT 0 - 44 IU/L 32 28   Alk Phos 39 - 117 IU/L 176 (H) 149 (H)   Bili, Total 0.0 - 1.2 mg/dL 5.9 (H) 4.7 (H)   Bili, Direct 0.00 - 0.40 mg/dL 2.22 (H) 2.00 (H)   Albumin 4.0 - 5.0 g/dL 3.2 (L) 2.4 (L)   BUN 6 - 24 mg/dL 10 8   Creat 0.76 - 1.27 mg/dL 1.03 0.98   Na 134 - 144 mmol/L 134 138   K 3.5 - 5.2 mmol/L 4.1 3.8   Cl 96 - 106 mmol/L 96 100   CO2 20 - 29 mmol/L 24 25   Glucose 65 - 99 mg/dL 86 93     Liver [facility] Latest Ref Rng & Units 5/22/2020   WBC 3.4 - 10.8 x10E3/uL 6.5   ANC 1.4 - 7.0 x10E3/uL 4.1   HGB 13.0 - 17.7 g/dL 11.9 (L)    - 450 x10E3/uL 68 (LL)   INR 0.8 - 1.2 1.4 (H)   AST 0 - 40 IU/L 66 (H)   ALT 0 - 44 IU/L 49 (H)   Alk Phos 39 - 117 IU/L 189 (H)   Bili, Total 0.0 - 1.2 mg/dL 3.7 (H)   Bili, Direct 0.00 - 0.40 mg/dL 1.97 (H)   Albumin 4.0 - 5.0 g/dL 2.7 (L)   BUN 6 - 24 mg/dL 6   Creat 0.76 - 1.27 mg/dL 0.78   Na 134 - 144 mmol/L 137   K 3.5 - 5.2 mmol/L 4.0   Cl 96 - 106 mmol/L 103   CO2 20 - 29 mmol/L 24   Glucose 65 - 99 mg/dL 66     Repeat testing done today.  Will follow up when available. SEROLOGIES:  Serologies Latest Ref Rng & Units 4/15/2020   Hep A Ab, Total Negative   Positive (A)   Hep B Surface Ag Index 0.32   Hep B Surface Ag Interp NEG   Negative   Hep B Core Ab, Total Negative   Negative   Hep B Surface Ab mIU/mL 4.08   Hep B Surface Ab Interp NR   NONREACTIVE   Hep C Ab NR   NONREACTIVE   Ferritin 26 - 388 NG/   Iron % Saturation 20 - 50 % 82 (H)     LIVER HISTOLOGY:  Not available or performed    ENDOSCOPIC PROCEDURES:  4/2020. EGD performed by [facility]. No esophageal varices. No gastric varices. Severe portal gastropathy. RADIOLOGY:  3/2020. Ultrasound of liver. Echogenic consistent with cirrhosis. No liver mass lesions. No dilated bile ducts. No ascites. 4/2020. Ultrasound of liver. Echogenic consistent with cirrhosis. No liver mass lesions. No dilated bile ducts. No ascites. OTHER TESTING:  Not available or performed    FOLLOW-UP:  Pursuant to the emergency declaration under the [facility], [facility] waiver authority and the [employer] Act, this Virtual  Visit was conducted, with the patient's (and/or their legal guardian's) consent, to reduce the patient's risk of exposure to COVID-19 and provide necessary medical care. Services were provided through a video synchronous discussion virtually to substitute for an in-person clinic visit. The patient was located in their home. The provider was located in the [place] [address] office. An in person follow up will be performed in 8 weeks. Orders to obtain laboratory testing will be mailed to the patient. LIAT Simental-BC  [address]  [address] [address] [place], [address] [facility] [address].  [address] [facility]

## 2020-10-10 LAB
ALBUMIN SERPL-MCNC: 2.6 G/DL (ref 4–5)
ALP SERPL-CCNC: 200 IU/L (ref 39–117)
ALT SERPL-CCNC: 35 IU/L (ref 0–44)
AST SERPL-CCNC: 87 IU/L (ref 0–40)
BASOPHILS # BLD AUTO: 0 X10E3/UL (ref 0–0.2)
BASOPHILS NFR BLD AUTO: 1 %
BILIRUB DIRECT SERPL-MCNC: 1.73 MG/DL (ref 0–0.4)
BILIRUB SERPL-MCNC: 3.7 MG/DL (ref 0–1.2)
BUN SERPL-MCNC: 7 MG/DL (ref 6–24)
BUN/CREAT SERPL: 9 (ref 9–20)
CALCIUM SERPL-MCNC: 8.4 MG/DL (ref 8.7–10.2)
CHLORIDE SERPL-SCNC: 107 MMOL/L (ref 96–106)
CO2 SERPL-SCNC: 24 MMOL/L (ref 20–29)
CREAT SERPL-MCNC: 0.8 MG/DL (ref 0.76–1.27)
EOSINOPHIL # BLD AUTO: 0.3 X10E3/UL (ref 0–0.4)
EOSINOPHIL NFR BLD AUTO: 5 %
ERYTHROCYTE [DISTWIDTH] IN BLOOD BY AUTOMATED COUNT: 15.1 % (ref 11.6–15.4)
GLUCOSE SERPL-MCNC: 93 MG/DL (ref 65–99)
HCT VFR BLD AUTO: 32.7 % (ref 37.5–51)
HGB BLD-MCNC: 12.2 G/DL (ref 13–17.7)
IMM GRANULOCYTES # BLD AUTO: 0 X10E3/UL (ref 0–0.1)
IMM GRANULOCYTES NFR BLD AUTO: 0 %
INR PPP: 1.5 (ref 0.9–1.2)
LYMPHOCYTES # BLD AUTO: 1.8 X10E3/UL (ref 0.7–3.1)
LYMPHOCYTES NFR BLD AUTO: 31 %
MCH RBC QN AUTO: 33.9 PG (ref 26.6–33)
MCHC RBC AUTO-ENTMCNC: 37.3 G/DL (ref 31.5–35.7)
MCV RBC AUTO: 91 FL (ref 79–97)
MONOCYTES # BLD AUTO: 0.7 X10E3/UL (ref 0.1–0.9)
MONOCYTES NFR BLD AUTO: 12 %
MORPHOLOGY BLD-IMP: ABNORMAL
NEUTROPHILS # BLD AUTO: 3 X10E3/UL (ref 1.4–7)
NEUTROPHILS NFR BLD AUTO: 51 %
PLATELET # BLD AUTO: 59 X10E3/UL (ref 150–450)
POTASSIUM SERPL-SCNC: 4 MMOL/L (ref 3.5–5.2)
PROT SERPL-MCNC: 6.8 G/DL (ref 6–8.5)
PROTHROMBIN TIME: 15.6 SEC (ref 9.1–12)
RBC # BLD AUTO: 3.6 X10E6/UL (ref 4.14–5.8)
SODIUM SERPL-SCNC: 139 MMOL/L (ref 134–144)
WBC # BLD AUTO: 5.7 X10E3/UL (ref 3.4–10.8)

## 2020-10-12 LAB
AFP L3 MFR SERPL: NORMAL % (ref 0–9.9)
AFP SERPL-MCNC: 6.6 NG/ML (ref 0–8)

## 2020-10-16 ENCOUNTER — VIRTUAL VISIT (OUTPATIENT)
Dept: HEMATOLOGY | Age: 45
End: 2020-10-16

## 2020-10-16 DIAGNOSIS — R60.1 GENERALIZED EDEMA: ICD-10-CM

## 2020-10-16 DIAGNOSIS — K70.30 ALCOHOLIC CIRRHOSIS OF LIVER WITHOUT ASCITES (HCC): ICD-10-CM

## 2020-10-16 DIAGNOSIS — F41.9 ANXIETY: Primary | ICD-10-CM

## 2020-10-16 PROCEDURE — 99215 OFFICE O/P EST HI 40 MIN: CPT | Performed by: NURSE PRACTITIONER

## 2020-10-16 RX ORDER — HYDROXYZINE PAMOATE 25 MG/1
25 CAPSULE ORAL 2 TIMES DAILY
Qty: 45 CAP | Refills: 1 | Status: SHIPPED | OUTPATIENT
Start: 2020-10-16

## 2020-10-16 NOTE — PROGRESS NOTES
3340 hospitalsMD Issac Doing, MD Daved Guile, PA-C Mitzi Lush, [facility]-BC     April S Declan, [facility]   Marylin Vazquez FNP-JOSE [facility]       Grisel Deputado [facility]ney[facility] 136    at [address], [address] [place] [address], [facility] 22.    [phone]    FAX: [address] [place] [address], [address]    [phone]    FAX: [phone]       Patient Care Team:  Ember Gonsales MD as PCP - General (Family Medicine)      Problem List  Date Reviewed: 8/5/2020          Codes Class Noted    Urinary retention ICD-10-CM: R33.9  ICD-9-CM: 788.20  3/86/6250        Alcoholic cirrhosis ([facility] [address] FDT-23-HN: K70.30  ICD-9-CM: 571.2  5/29/2020        Generalized edema ICD-10-CM: R60.1  ICD-9-CM: 782.3  5/71/3126        Alcoholic hepatitis YPR-50-BK: K70.10  ICD-9-CM: 571.1  4/24/2020        Anemia ICD-10-CM: D64.9  ICD-9-CM: 285.9  4/24/2020        Jaundice ICD-10-CM: R17  ICD-9-CM: 782.4  4/24/2020        Thrombocytopenia ([facility] [address] ICD-10-CM: D69.6  ICD-9-CM: 287.5  4/24/2020              VIRTUAL TELEHEALTH VISIT PERFORMED DUE TO COVID-19 EPIDEMIC    CONSENT:  Prashanth Santoyo, who was seen by synchronous, real-time, audio-video technology, and/or his healthcare decision maker, is aware that this patient-initiated, Telehealth encounter on 10/16/2020 is a billable service, with coverage as determined by his insurance carrier. He is aware that he may receive a bill and has provided verbal consent to proceed. This patient was evaluated during a Virtual Telehealth visit. A caregiver was present if appropriate.  Due to this being a TeleHealth encounter performed during the JWAGO-99 public health emergency, the physical examination was limited to that listed in the [facility] 51 returns to the [place] [address] for management of cirrhosis secondary to alcoholic liver disease. The active problem list, all pertinent past medical history, medications, radiologic findings and laboratory findings related to the liver disorder were reviewed with the patient. The patient is a 54 y.o.  male who was found to have chronic liver disease and cirrhosis in 1/2020 when he underwent a CT scan following hemroidectomy. There is a long history of excessive alcohol intake and for the past 6 months prior to the recent hospitialization he was consuming 1 gallon of whisky every 3 days. He was hospitalized with alcohol hepatitis 4/2020 and had a DF 60. He was started on steroids and discharged on prednisone 20 mg BID. The prednisone was completed 5/14/2020. An assessment of liver fibrosis with biopsy or elastography has not been performed. Serologic evaluation for markers of chronic liver disease was negative     The patient has discontinued protonix and is only taking furosemide 40 mg daily. He reports an improvement in lower extremity edema. The patient has developed the following complications of cirrhosis: ascites, edema. Since the last office visit the patient reports ongoing loose stools up to 4 per day. He has a good appetite but tends to have poor food choices. The patient has the following symptoms which are thought to be due to the liver disease:  fatigue, pain in the right side over the liver. The patient is not currently experiencing the following symptoms of liver disease:  yellowing of the eyes or skin has resolved, problems concentrating, swelling of the abdomen, swelling of the lower extremities, hematemesis,   hematochezia. The patient has Moderate limitations in functional activities which can be attributed to the liver disease.         ASSESSMENT AND PLAN:  Cirrhosis  Suspect he has developed cirrhosis secondary to alcohol. Serologic testing for causes of chronic liver disease were negative. The diagnosis of cirrhosis is based upon imaging, laboratory studies, complications of cirrhosis. The CTP is 9. Child class B. The MELD score is 15. AST is elevated. ALT is normal.  ALP is elevated. Total bilirubin is improved but still elevated. Serum albumin is normal.  INR is better but still prolonged. The platelet count is depressed. Have performed laboratory testing to monitor liver function and degree of liver injury. This included BMP, hepatic panel, CBC with platelet count, INR. The need to perform an assessment of liver fibrosis was discussed with the patient. The Fibroscan can assess liver fibrosis and determine if a patient has advanced fibrosis or cirrhosis without the need for liver biopsy. This will be performed after 3-6 months of abstinence. The Fibroscan can be repeated annually or as often as clinically indicated to assess for fibrosis progression and/or regression. Alcohol hepatitis  The diagnosis is based upon a history of consuming alcohol in excess on a daily basis for many years. The DF was up to 61 when he was hospitalized last week. The patient completed treatment with prednisone 40 mg every day for 28 days 5/14/2020. The patient has remained abstinent from alcohol since hospital discharge 4/12/2020. His significant other, who also consumed alcohol in excess, has stopped drinking as well. He is currently not involved in a treatment program.     Ascites   Is well controlled on furosemide 40 mg daily. Lower extremity edema  Mild to moderate at times. Currently on furosemide 40 mg daily. Discussed lowering intake of high sodium containing foods. Diarrhea  Having 4 loose stools per day. This may be related to his diet. Advised he have healthier food choices with protein, vegetables, and fruit. Urinary Retention   This has been a chronic issue. A prostate exam was conducted by the PCP, patient reports this was normal.   Urine with reflex was normal.    Breast Tenderness  This is a new issue and may be related to low testosterone. He may follow up with PCP or urology for further workup. Robert Dixon written for [employer]. Patient is at an increased risk of complication from JNPRI-90. Anxiety/Sleep Disturbance  Will start Vistaril 1-2 at bedtime. Screening for Esophageal varices   The patient does not have esophageal or gastric varices. The last EGD to assess for varices was performed in 4/2020. Repeat EGD will be due 4/2022. Hepatic encephalopathy   Overt HE has not developed to date. There is no need for treatment with lactulose and/or Xifaxan at this time. Thrombocytopenia   This is secondary to cirrhosis or bone marrow suppression from alcohol. There is no evidence of overt bleeding. No treatment is required. The platelet count is adequate for the patient to undergo procedures without the need for platelet transfusion or platelet growth factors. Anemia   This is due to multifactorial causes including portal hypertension with chronic GI blood loss, bone marrow suppression secondary to malnutrition and alcohol. The serum ferritin is within the normal range  The FE saturation is elevated. The HGB is now normal.    Screening for Hepatocellular Carcinoma  HCC screening has recently been performed and does not suggest [facility] [address] The next liver imaging study will be performed in 10/2020. Phone number given to patient to schedule. Treatment of other medical problems in patients with chronic liver disease  There are no contraindications for the patient to take most medications that are necessary for treatment of other medical issues. The patient consumes alcohol on a daily basis or has recently stopped consuming alcohol.   Regular alcohol use increases the risk of toxicity from acetaminophen. This analgesic should be avoided until the patient has been abstinent from alcohol for 6 months. Counseling for alcohol in patients with chronic liver disease  The patient was counseled regarding alcohol consumption and the effect of alcohol on chronic liver disease. It was recommended that all alcohol consumption be stopped and the patient be abstinent from alcohol    Osteoporosis  The risk of osteoporosis is increased in patients with cirrhosis. DEXA bone density to assess for osteoporosis has not been performed. This should be ordered by the patients primary care physician. Vaccinations   Vaccination for viral hepatitis B is recommended since the patient has no serologic evidence of previous exposure or vaccination with immunity. Vaccination for viral hepatitis A is not needed. The patient has serologic evidence of prior exposure or vaccination with immunity. Routine vaccinations against other bacterial and viral agents can be performed as indicated. Annual flu vaccination should be administered if indicated. ALLERGIES  No Known Allergies    MEDICATIONS  Current Outpatient Medications   Medication Sig    furosemide (LASIX) 40 mg tablet Take 1 Tab by mouth daily. No current facility-administered medications for this visit. SYSTEM REVIEW NOT RELATED TO LIVER DISEASE OR REVIEWED ABOVE:  Constitution systems: Negative for fever, chills, weight gain, weight loss. Eyes: Negative for visual changes. ENT: Negative for sore throat, painful swallowing. Respiratory: Negative for cough, hemoptysis, SOB. Cardiology: Negative for chest pain, palpitations. GI:  Negative for constipation or diarrhea. : Negative for urinary frequency, dysuria, hematuria, nocturia. Skin: Negative for rash. Hematology: Negative for easy bruising, blood clots. Musculo-skelatal: Negative for back pain, muscle pain, weakness.   Neurologic: Negative for headaches, dizziness, vertigo, memory problems not related to HE. Psychology: Negative for anxiety, depression. FAMILY HISTORY:  The patient has no knowledge of the father's medical condition. The mother Has/had the following chronic disease(s): None. There is no family history of liver disease.       SOCIAL HISTORY:  The patient has never been . The patient has no children. The patient currently smokes 1/2 pack of tobacco daily. The patient consumes alcohol in excess. 1 gallon of whiskey every 3 days. The patient used to work as . The patient has not worked since 1/2020. PHYSICAL EXAMINATION PERFORMED BY VIRTUAL TELEHEALTH:  VS: Not performed   General: No acute distress. Eyes: Sclera anicteric. ENT: No oral lesions. Skin: No rashes. spider angiomata. No jaundice. Abdomen: No obvious distention suggesting ascites. Extremities: No edema. No muscle wasting. Neurologic: Alert and oriented. Cranial nerves grossly intact.       LABORATORY STUDIES:  [facility] [place] of [address] & Units 10/9/2020 7/10/2020   WBC 3.4 - 10.8 x10E3/uL 5.7 7.1   ANC 1.4 - 7.0 x10E3/uL 3.0 4.0   HGB 13.0 - 17.7 g/dL 12.2 (L) 13.0    - 450 x10E3/uL 59 (LL) 74 (LL)   INR 0.9 - 1.2 1.5 (H) 1.5 (H)   AST 0 - 40 IU/L 87 (H) 80 (H)   ALT 0 - 44 IU/L 35 32   Alk Phos 39 - 117 IU/L 200 (H) 176 (H)   Bili, Total 0.0 - 1.2 mg/dL 3.7 (H) 5.9 (H)   Bili, Direct 0.00 - 0.40 mg/dL 1.73 (H) 2.22 (H)   Albumin 4.0 - 5.0 g/dL 2.6 (L) 3.2 (L)   BUN 6 - 24 mg/dL 7 10   Creat 0.76 - 1.27 mg/dL 0.80 1.03   Na 134 - 144 mmol/L 139 134   K 3.5 - 5.2 mmol/L 4.0 4.1   Cl 96 - 106 mmol/L 107 (H) 96   CO2 20 - 29 mmol/L 24 24   Glucose 65 - 99 mg/dL 93 86     [facility] [place] [facility] [place] Latest Ref Rng & Units 6/19/2020   WBC 3.4 - 10.8 x10E3/uL 7.5   ANC 1.4 - 7.0 x10E3/uL 3.9   HGB 13.0 - 17.7 g/dL 12.1 (L)    - 450 x10E3/uL 74 (LL)   INR 0.9 - 1.2 1.5 (H)   AST 0 - 40 IU/L 73 (H) ALT 0 - 44 IU/L 28   Alk Phos 39 - 117 IU/L 149 (H)   Bili, Total 0.0 - 1.2 mg/dL 4.7 (H)   Bili, Direct 0.00 - 0.40 mg/dL 2.00 (H)   Albumin 4.0 - 5.0 g/dL 2.4 (L)   BUN 6 - 24 mg/dL 8   Creat 0.76 - 1.27 mg/dL 0.98   Na 134 - 144 mmol/L 138   K 3.5 - 5.2 mmol/L 3.8   Cl 96 - 106 mmol/L 100   CO2 20 - 29 mmol/L 25   Glucose 65 - 99 mg/dL 93     Cancer Screening Latest Ref Rng & Units 10/9/2020   AFP, Serum 0.0 - 8.0 ng/mL 6.6   AFP-L3% 0.0 - 9.9 % Comment     SEROLOGIES:  Serologies Latest Ref Rng & Units 4/15/2020   Hep A Ab, Total Negative   Positive (A)   Hep B Surface Ag Index 0.32   Hep B Surface Ag Interp NEG   Negative   Hep B Core Ab, Total Negative   Negative   Hep B Surface Ab mIU/mL 4.08   Hep B Surface Ab Interp NR   NONREACTIVE   Hep C Ab NR   NONREACTIVE   Ferritin 26 - 388 NG/   Iron % Saturation 20 - 50 % 82 (H)     LIVER HISTOLOGY:  Not available or performed    ENDOSCOPIC PROCEDURES:  4/2020. EGD performed by [facility]. No esophageal varices. No gastric varices. Severe portal gastropathy. RADIOLOGY:  3/2020. Ultrasound of liver. Echogenic consistent with cirrhosis. No liver mass lesions. No dilated bile ducts. No ascites. 4/2020. Ultrasound of liver. Echogenic consistent with cirrhosis. No liver mass lesions. No dilated bile ducts. No ascites. OTHER TESTING:  Not available or performed    FOLLOW-UP:  Pursuant to the emergency declaration under the [facility], 1135 waiver authority and the [employer] and [employer] Act, this Virtual  Visit was conducted, with the patient's (and/or their legal guardian's) consent, to reduce the patient's risk of exposure to COVID-19 and provide necessary medical care. Services were provided through a video synchronous discussion virtually to substitute for an in-person clinic visit. The patient was located in their home.   The provider was located in the [facility] [place] office. An in person follow up will be performed in 8 weeks. Orders to obtain laboratory testing will be mailed to the patient. [facility] of [address] [address] Jose Mariscal, [address] [place] [address] 22.  [address] [facility]

## (undated) DEVICE — TUBING HYDR IRR --